# Patient Record
Sex: FEMALE | Race: WHITE | URBAN - METROPOLITAN AREA
[De-identification: names, ages, dates, MRNs, and addresses within clinical notes are randomized per-mention and may not be internally consistent; named-entity substitution may affect disease eponyms.]

---

## 2023-02-17 ENCOUNTER — APPOINTMENT (RX ONLY)
Dept: URBAN - METROPOLITAN AREA CLINIC 330 | Facility: CLINIC | Age: 23
Setting detail: DERMATOLOGY
End: 2023-02-17

## 2023-02-17 DIAGNOSIS — D22 MELANOCYTIC NEVI: ICD-10-CM

## 2023-02-17 PROBLEM — D22.62 MELANOCYTIC NEVI OF LEFT UPPER LIMB, INCLUDING SHOULDER: Status: ACTIVE | Noted: 2023-02-17

## 2023-02-17 PROCEDURE — ? COUNSELING

## 2023-02-17 PROCEDURE — 11301 SHAVE SKIN LESION 0.6-1.0 CM: CPT

## 2023-02-17 PROCEDURE — ? ADDITIONAL NOTES

## 2023-02-17 PROCEDURE — ? SHAVE REMOVAL

## 2023-02-17 ASSESSMENT — LOCATION ZONE DERM: LOCATION ZONE: ARM

## 2023-02-17 ASSESSMENT — LOCATION SIMPLE DESCRIPTION DERM: LOCATION SIMPLE: LEFT SHOULDER

## 2023-02-17 ASSESSMENT — LOCATION DETAILED DESCRIPTION DERM: LOCATION DETAILED: LEFT POSTERIOR SHOULDER

## 2023-02-17 NOTE — PROCEDURE: SHAVE REMOVAL
Medical Necessity Information: It is in your best interest to select a reason for this procedure from the list below. All of these items fulfill various CMS LCD requirements except the new and changing color options.
Medical Necessity Clause: This procedure was medically necessary because the lesion that was treated was:
Lab: 6
Lab Facility: 3
Detail Level: Detailed
Was A Bandage Applied: Yes
Size Of Lesion In Cm (Required): 0.8
X Size Of Lesion In Cm (Optional): 0
Depth Of Shave: dermis
Biopsy Method: Dermablade
Anesthesia Type: 1% lidocaine with epinephrine
Hemostasis: Drysol
Wound Care: Petrolatum
Render Path Notes In Note?: No
Consent was obtained from the patient. The risks and benefits to therapy were discussed in detail. Specifically, the risks of infection, scarring, bleeding, prolonged wound healing, incomplete removal, allergy to anesthesia, nerve injury and recurrence were addressed. Prior to the procedure, the treatment site was clearly identified and confirmed by the patient. All components of Universal Protocol/PAUSE Rule completed.
Post-Care Instructions: I reviewed with the patient in detail post-care instructions. Patient is to keep the biopsy site dry overnight, and then apply bacitracin twice daily until healed. Patient may apply hydrogen peroxide soaks to remove any crusting.
Notification Instructions: Patient will be notified of pathology results. However, patient instructed to call the office if not contacted within 2 weeks.
Billing Type: Third-Party Bill

## 2024-04-27 SDOH — ECONOMIC STABILITY: FOOD INSECURITY: WITHIN THE PAST 12 MONTHS, THE FOOD YOU BOUGHT JUST DIDN'T LAST AND YOU DIDN'T HAVE MONEY TO GET MORE.: NEVER TRUE

## 2024-04-27 SDOH — ECONOMIC STABILITY: HOUSING INSECURITY
IN THE LAST 12 MONTHS, WAS THERE A TIME WHEN YOU DID NOT HAVE A STEADY PLACE TO SLEEP OR SLEPT IN A SHELTER (INCLUDING NOW)?: NO

## 2024-04-27 SDOH — ECONOMIC STABILITY: FOOD INSECURITY: WITHIN THE PAST 12 MONTHS, YOU WORRIED THAT YOUR FOOD WOULD RUN OUT BEFORE YOU GOT MONEY TO BUY MORE.: SOMETIMES TRUE

## 2024-04-27 SDOH — ECONOMIC STABILITY: INCOME INSECURITY: HOW HARD IS IT FOR YOU TO PAY FOR THE VERY BASICS LIKE FOOD, HOUSING, MEDICAL CARE, AND HEATING?: NOT VERY HARD

## 2024-04-27 SDOH — ECONOMIC STABILITY: TRANSPORTATION INSECURITY
IN THE PAST 12 MONTHS, HAS LACK OF TRANSPORTATION KEPT YOU FROM MEETINGS, WORK, OR FROM GETTING THINGS NEEDED FOR DAILY LIVING?: NO

## 2024-04-30 ENCOUNTER — OFFICE VISIT (OUTPATIENT)
Dept: OBGYN CLINIC | Age: 24
End: 2024-04-30
Payer: COMMERCIAL

## 2024-04-30 VITALS
SYSTOLIC BLOOD PRESSURE: 122 MMHG | HEIGHT: 64 IN | DIASTOLIC BLOOD PRESSURE: 84 MMHG | WEIGHT: 169 LBS | BODY MASS INDEX: 28.85 KG/M2

## 2024-04-30 DIAGNOSIS — Z01.419 WELL WOMAN EXAM WITH ROUTINE GYNECOLOGICAL EXAM: Primary | ICD-10-CM

## 2024-04-30 DIAGNOSIS — Z12.4 SCREENING FOR CERVICAL CANCER: ICD-10-CM

## 2024-04-30 DIAGNOSIS — Z11.3 SCREEN FOR STD (SEXUALLY TRANSMITTED DISEASE): ICD-10-CM

## 2024-04-30 DIAGNOSIS — Z11.8 SCREENING EXAMINATION FOR PARASITIC INFECTION: ICD-10-CM

## 2024-04-30 PROCEDURE — 99459 PELVIC EXAMINATION: CPT | Performed by: NURSE PRACTITIONER

## 2024-04-30 PROCEDURE — 99385 PREV VISIT NEW AGE 18-39: CPT | Performed by: NURSE PRACTITIONER

## 2024-04-30 NOTE — PROGRESS NOTES
CC:  Annual GYN exam    HPI:  23 y.o., G0 presents today for a routine gynecological examination and to establish care with our office. Patient's last menstrual period was 04/12/2024.    Pt additionally wants to discuss c/o possible ingrown hair. The patient states she is prone to getting ingrown hairs after shaving vaginal area. States she did notice an ingrown hair to right side of vagina last week and informs me that \"I did mess with it and it opened up.\" Patient states she has been doing routine of the following: exfoliates prior to shaving, shaves, exfoliates again with exfoliating glove and then applies toner to area. States the area to the right side of vagina \"is better than it was because I have been putting healing ointment on but I wanted you to look at it.\" Denies any fevers or chills.     PCP: Patient states was seeing Alisa however, has not seen in 2 years. Unable to view records-will obtain consent for records release today.     Discussed [facility] Internal medicine in same building as us today. States will stop by the desk on the way out to inquire about establishing care as new patient. She is aware of the wait time of a few months.     Contraception: Condoms (sometimes)      Menses:  Q 28-30  Days x 4-6 days, Moderate Flow, No intermenstrual VB/spotting, Mild dysmenorrhea (prior to cycle and days 1 and 2, does not take need to take OTC medications)    No contraindications to estrogen exist        Sexually active w/Male partner  No changes in sexual partners --would like STD testing on Pap today.   Denies post coital VB or dyspareunia.        Ob hx:  G0      GYN HISTORY:  As per HPI     Last Pap: 2022-Neg (unable to view results, will obtain consent for release of records)  Hx of Abnl Paps: Denies    Hx STDs Denies  Gardasil vaccine: Does not believe she has had. Discussed new age recommendations with patient today and encouraged her if she would like to receive to contact insurance company to ensure

## 2024-05-15 LAB
C TRACH RRNA CVX QL NAA+PROBE: NEGATIVE
COLLECTION METHOD: ABNORMAL
CYTOLOGIST CVX/VAG CYTO: ABNORMAL
CYTOLOGY CVX/VAG DOC THIN PREP: ABNORMAL
DATE OF LMP: ABNORMAL
HPV APTIMA: POSITIVE
HPV REFLEX: ABNORMAL
Lab: ABNORMAL
N GONORRHOEA RRNA CVX QL NAA+PROBE: NEGATIVE
OTHER PT INFO: ABNORMAL
PAP SOURCE: ABNORMAL
PATH REPORT.FINAL DX SPEC: ABNORMAL
PATHOLOGIST CVX/VAG CYTO: ABNORMAL
PATHOLOGIST PROVIDED ICD: ABNORMAL
PREV CYTO INFO: NEGATIVE
PREV TREATMENT RESULTS: ABNORMAL
PREV TREATMENT: ABNORMAL
RECOM F/U CVX/VAG CYTO: ABNORMAL
STAT OF ADQ CVX/VAG CYTO-IMP: ABNORMAL
T VAGINALIS RRNA SPEC QL NAA+PROBE: NEGATIVE

## 2024-07-19 LAB
HPV APTIMA: POSITIVE
SPECIMEN SOURCE: ABNORMAL

## 2024-12-17 NOTE — PROGRESS NOTES
CC:  Missed Period    HPI:  24 y.o.  presents for pregnancy confirmation and establish COLLEEN.  Patient's last menstrual period was 10/21/2024.,  sure, regular cycles.      She reports experiencing nausea however, denies vomiting. Able to keep po foods and fluids down.   Encouraged use of B6+Unisom and small, frequent meals to assist.     Denies vb/cramping    She denies abnormal vaginal discharge, itching, odor, irritation, urinary frequency, urgency or dysuria today.   She reports having regular bowel movements and denies constipation.     Genetic testing briefly discussed with patient today however, will discuss further at new ob education visit       OB hx:    Fam hx any chromosomal or inheritable disorders: Denies    Hx abnormal pap:   2024-ASCUS/Neg HPV    Hx Vasovagal syncope:   States 8 years ago, attributed it to headaches  Has not had any flare ups recently    Exposure to Toxoplasmosis:   Has 2 indoor cats. Discussed avoiding litter box during pregnancy.   Will collect Toxoplasma Abs today ___    Anxiety:  Situational anxiety  Did see therapy in college however, was not never put on medications.   Denies depression    Obesity:   BMI: 31  Will collect A1C __        Her Ob history is as follows:  # 1 - Date: None, Sex: None, Weight: None, GA: None, Type: None, Apgar1: None, Apgar5: None, Living: None, Birth Comments: None      Past medical History:   Active Ambulatory Problems     Diagnosis Date Noted    No Active Ambulatory Problems     Resolved Ambulatory Problems     Diagnosis Date Noted    No Resolved Ambulatory Problems     Past Medical History:   Diagnosis Date    Abnormal Pap smear of cervix     Anxiety     BMI 31.0-31.9,adult     Syncope        Current Outpatient Medications   Medication Sig    Prenatal MV-Min-Fe Fum-FA-DHA (PRENATAL 1 PO) Take by mouth     No current facility-administered medications for this visit.       Past Surgical:  has no past surgical history on file.    No Known

## 2024-12-18 ENCOUNTER — PROCEDURE VISIT (OUTPATIENT)
Dept: OBGYN CLINIC | Age: 24
End: 2024-12-18
Payer: COMMERCIAL

## 2024-12-18 ENCOUNTER — OFFICE VISIT (OUTPATIENT)
Dept: OBGYN CLINIC | Age: 24
End: 2024-12-18
Payer: COMMERCIAL

## 2024-12-18 VITALS
DIASTOLIC BLOOD PRESSURE: 82 MMHG | HEIGHT: 64 IN | BODY MASS INDEX: 30.93 KG/M2 | SYSTOLIC BLOOD PRESSURE: 126 MMHG | WEIGHT: 181.2 LBS

## 2024-12-18 DIAGNOSIS — Z87.42 HISTORY OF ABNORMAL CERVICAL PAP SMEAR: ICD-10-CM

## 2024-12-18 DIAGNOSIS — Z3A.08 8 WEEKS GESTATION OF PREGNANCY: ICD-10-CM

## 2024-12-18 DIAGNOSIS — Z34.01 ENCOUNTER FOR PRENATAL CARE IN FIRST TRIMESTER OF FIRST PREGNANCY: ICD-10-CM

## 2024-12-18 DIAGNOSIS — N92.6 MISSED MENSES: Primary | ICD-10-CM

## 2024-12-18 DIAGNOSIS — F41.9 ANXIETY: ICD-10-CM

## 2024-12-18 DIAGNOSIS — R55 VASOVAGAL SYNCOPE: ICD-10-CM

## 2024-12-18 PROBLEM — G43.709 CHRONIC MIGRAINE WITHOUT AURA WITHOUT STATUS MIGRAINOSUS, NOT INTRACTABLE: Status: RESOLVED | Noted: 2017-06-05 | Resolved: 2024-12-18

## 2024-12-18 LAB
ABO + RH BLD: NORMAL
BASOPHILS # BLD: 0 K/UL (ref 0–0.2)
BASOPHILS NFR BLD: 0 % (ref 0–2)
BLOOD GROUP ANTIBODIES SERPL: NORMAL
DIFFERENTIAL METHOD BLD: ABNORMAL
EOSINOPHIL # BLD: 0.2 K/UL (ref 0–0.8)
EOSINOPHIL NFR BLD: 1 % (ref 0.5–7.8)
ERYTHROCYTE [DISTWIDTH] IN BLOOD BY AUTOMATED COUNT: 13.2 % (ref 11.9–14.6)
EST. AVERAGE GLUCOSE BLD GHB EST-MCNC: 98 MG/DL
HBA1C MFR BLD: 5.1 % (ref 0–5.6)
HBV SURFACE AG SER QL: NONREACTIVE
HCT VFR BLD AUTO: 41 % (ref 35.8–46.3)
HCV AB SER QL: NONREACTIVE
HGB BLD-MCNC: 13.9 G/DL (ref 11.7–15.4)
HIV 1+2 AB+HIV1 P24 AG SERPL QL IA: NONREACTIVE
HIV 1/2 RESULT COMMENT: NORMAL
IMM GRANULOCYTES # BLD AUTO: 0.1 K/UL (ref 0–0.5)
IMM GRANULOCYTES NFR BLD AUTO: 0 % (ref 0–5)
LYMPHOCYTES # BLD: 1.8 K/UL (ref 0.5–4.6)
LYMPHOCYTES NFR BLD: 14 % (ref 13–44)
MCH RBC QN AUTO: 29 PG (ref 26.1–32.9)
MCHC RBC AUTO-ENTMCNC: 33.9 G/DL (ref 31.4–35)
MCV RBC AUTO: 85.4 FL (ref 82–102)
MONOCYTES # BLD: 0.5 K/UL (ref 0.1–1.3)
MONOCYTES NFR BLD: 4 % (ref 4–12)
NEUTS SEG # BLD: 10.1 K/UL (ref 1.7–8.2)
NEUTS SEG NFR BLD: 81 % (ref 43–78)
NRBC # BLD: 0 K/UL (ref 0–0.2)
PLATELET # BLD AUTO: 228 K/UL (ref 150–450)
PMV BLD AUTO: 10.4 FL (ref 9.4–12.3)
RBC # BLD AUTO: 4.8 M/UL (ref 4.05–5.2)
RUBV IGG SERPL IA-ACNC: 69.2 IU/ML
T PALLIDUM AB SER QL IA: NONREACTIVE
WBC # BLD AUTO: 12.7 K/UL (ref 4.3–11.1)

## 2024-12-18 PROCEDURE — 99214 OFFICE O/P EST MOD 30 MIN: CPT | Performed by: NURSE PRACTITIONER

## 2024-12-18 PROCEDURE — 76830 TRANSVAGINAL US NON-OB: CPT | Performed by: OBSTETRICS & GYNECOLOGY

## 2024-12-18 NOTE — PROGRESS NOTES
I have reviewed the patients chart and note and agree with plan set forth by Kat Ayala NP.    Trish Boo, DO

## 2024-12-18 NOTE — PATIENT INSTRUCTIONS
PTL/labor precautions, FMC, and pregnancy warning signs reviewed. Pt advised to call the office at [phone] or go straight to Labor and Delivery at [facility] with any of the following concerns vaginal bleeding, leaking of fluid, marline regularly Q 5-7 minutes for over an hour.

## 2024-12-19 LAB
T GONDII IGG SERPL IA-ACNC: <3 IU/ML (ref 0–7.1)
T GONDII IGM SER IA-ACNC: <3 AU/ML (ref 0–7.9)
TOXOPLASMA COMMENT: NORMAL

## 2024-12-20 LAB
BACTERIA SPEC CULT: ABNORMAL
BACTERIA SPEC CULT: ABNORMAL
SERVICE CMNT-IMP: ABNORMAL

## 2024-12-23 ENCOUNTER — TELEPHONE (OUTPATIENT)
Dept: OBGYN CLINIC | Age: 24
End: 2024-12-23

## 2024-12-23 PROBLEM — O99.891 ASYMPTOMATIC BACTERIURIA DURING PREGNANCY: Status: ACTIVE | Noted: 2024-12-23

## 2024-12-23 PROBLEM — R82.71 ASYMPTOMATIC BACTERIURIA DURING PREGNANCY: Status: ACTIVE | Noted: 2024-12-23

## 2024-12-23 RX ORDER — NITROFURANTOIN 25; 75 MG/1; MG/1
100 CAPSULE ORAL 2 TIMES DAILY
Qty: 20 CAPSULE | Refills: 0 | Status: SHIPPED | OUTPATIENT
Start: 2024-12-23 | End: 2025-01-02

## 2024-12-26 NOTE — PROGRESS NOTES
NOB consult with patient and the father of baby. Labs today: NIPT, Carrier testing. Offered pt the option of CF, SMA, and Fragile X carrier testing. Pt desires testing. Offered pt the option of genetic screening (1st screen vs Tetra vs NIPT). Pt desires NIPT. Instructed pt on exercise/nutrition in pregnancy. Reviewed [facility] preg book. Advised pt on using [facility] for hospital needs and [facility] L&D for pregnancy related emergencies. IMPACTT Program discussed with patient and sheet given to her during visit today. Encouraged her to inform us should she start having increase in depression/anxiety. Pt states understanding. NOB forms signed, scanned, and given to pt.     Vitals:  Weight: 182 lb  BMI: 31    Medical Hx: Abnormal pap smear (04/2024) ASCUS neg HPV. - Vasovagal syncope (2016) no recent flare ups. - Chronic migraines.  Anxiety, stable.    Surgical Hx: None.    Last Pap: 04/2024 ASCUS neg HPV.    Pt OB c/o: Patient states that she is considering transferring her care to a birthing center.    Fam hx any chromosomal or inheritable disorders: None.    COVID Vaccine: x2 per patient  Flu Vaccine: Discussed flu recommendations with patient. Patient declines flu vaccine.    OB hx: G1: 01/2025, Current.    NV: Next appointment scheduled on 1/21 with Dr Khan.

## 2025-01-02 ENCOUNTER — NURSE ONLY (OUTPATIENT)
Dept: OBGYN CLINIC | Age: 25
End: 2025-01-02

## 2025-01-02 VITALS — HEIGHT: 64 IN | BODY MASS INDEX: 31.07 KG/M2 | WEIGHT: 182 LBS

## 2025-01-02 DIAGNOSIS — F41.9 ANXIETY: ICD-10-CM

## 2025-01-02 DIAGNOSIS — Z34.91 NORMAL PREGNANCY, FIRST TRIMESTER: ICD-10-CM

## 2025-01-02 DIAGNOSIS — Z3A.10 10 WEEKS GESTATION OF PREGNANCY: Primary | ICD-10-CM

## 2025-01-02 DIAGNOSIS — Z87.42 HISTORY OF ABNORMAL CERVICAL PAP SMEAR: ICD-10-CM

## 2025-01-02 DIAGNOSIS — R55 VASOVAGAL SYNCOPE: ICD-10-CM

## 2025-01-10 LAB
Lab: NEGATIVE
Lab: NORMAL
NTRA ALPHA-THALASSEMIA: NEGATIVE
NTRA BETA-HEMOGLOBINOPATHIES: NEGATIVE
NTRA CANAVAN DISEASE: NEGATIVE
NTRA CYSTIC FIBROSIS: NEGATIVE
NTRA DUCHENNE/BECKER MUSCULAR DYSTROPHY: NEGATIVE
NTRA FAMILIAL DYSAUTONOMIA: NEGATIVE
NTRA FRAGILE X SYNDROME: NEGATIVE
NTRA GALACTOSEMIA: NEGATIVE
NTRA GAUCHER DISEASE: NEGATIVE
NTRA MEDIUM CHAIN ACYL-COA DEHYDROGENASE DEFICIENCY: NEGATIVE
NTRA POLYCYSTIC KIDNEY DISEASE, AUTOSOMAL RECESSIVE: NEGATIVE
NTRA SMITH-LEMLI-OPITZ SYNDROME: NEGATIVE
NTRA SPINAL MUSCULAR ATROPHY: NEGATIVE
NTRA TAY-SACHS DISEASE: NEGATIVE

## 2025-01-21 ENCOUNTER — ROUTINE PRENATAL (OUTPATIENT)
Dept: OBGYN CLINIC | Age: 25
End: 2025-01-21

## 2025-01-21 VITALS — SYSTOLIC BLOOD PRESSURE: 108 MMHG | BODY MASS INDEX: 31.24 KG/M2 | WEIGHT: 182 LBS | DIASTOLIC BLOOD PRESSURE: 68 MMHG

## 2025-01-21 DIAGNOSIS — Z34.01 SUPERVISION OF NORMAL FIRST PREGNANCY IN FIRST TRIMESTER: Primary | ICD-10-CM

## 2025-01-21 DIAGNOSIS — Z11.3 SCREENING FOR STD (SEXUALLY TRANSMITTED DISEASE): ICD-10-CM

## 2025-01-21 DIAGNOSIS — Z12.4 SCREENING FOR CERVICAL CANCER: ICD-10-CM

## 2025-01-21 DIAGNOSIS — R87.610 ATYPICAL SQUAMOUS CELLS OF UNDETERMINED SIGNIFICANCE ON CYTOLOGIC SMEAR OF CERVIX (ASC-US): ICD-10-CM

## 2025-01-21 DIAGNOSIS — Z11.8 SCREENING EXAMINATION FOR PARASITIC INFECTION: ICD-10-CM

## 2025-01-21 PROCEDURE — 0500F INITIAL PRENATAL CARE VISIT: CPT | Performed by: OBSTETRICS & GYNECOLOGY

## 2025-01-21 NOTE — PROGRESS NOTES
Musculoskeletal: Normal range of motion and neck supple.      Thyroid: No thyromegaly.      Vascular: No JVD.      Trachea: No tracheal deviation.   Cardiovascular:      Rate and Rhythm: Normal rate and regular rhythm.      Heart sounds: Normal heart sounds. No murmur. No friction rub. No gallop.    Pulmonary:      Effort: Pulmonary effort is normal. No respiratory distress.      Breath sounds: Normal breath sounds. No stridor. No wheezing, rhonchi or rales.   Chest:      Chest wall: No tenderness.      Breasts:         Right: No inverted nipple, mass, nipple discharge, skin change or tenderness.         Left: No inverted nipple, mass, nipple discharge, skin change or tenderness.   Abdominal:      General: Bowel sounds are normal. There is no distension.      Palpations: Abdomen is soft. There is no mass.      Tenderness: There is no abdominal tenderness. There is no guarding or rebound.   Genitourinary:     Labia:         Right: No rash, tenderness, lesion or injury.         Left: No rash, tenderness, lesion or injury.       Vagina: Normal. No signs of injury and foreign body. No vaginal discharge, erythema, tenderness or bleeding.      Cervix: No cervical motion tenderness, discharge or friability.      Uterus: Enlarged and not tender.       Adnexa:         Right: No mass, tenderness or fullness.          Left: No mass, tenderness or fullness.        Comments: External genitalia are normal in appearance.    Examination of urethral meatus reveals location normal and size normal.   Examination of urethra shows no abnormalities.   Examination of vaginal vault reveals no abnormalities.   Cervix is long and closed without visible pathology.   Pap smear collected.  Uterine portion of bimanual exam reveals contour normal, shape regular, descensus absent, no nodularity, no tenderness and size 13 weeks GA.    Adnexa and parametria exam reveals no masses, nontender.    Visual examination of anus and perineum shows no

## 2025-01-29 LAB
C TRACH RRNA CVX QL NAA+PROBE: NEGATIVE
COLLECTION METHOD: ABNORMAL
CYTOLOGIST CVX/VAG CYTO: ABNORMAL
CYTOLOGY CVX/VAG DOC THIN PREP: ABNORMAL
DATE OF LMP: 0
HPV APTIMA: POSITIVE
HPV GENOTYPE 18,45: NEGATIVE
HPV GENOTYPE REFLEX: ABNORMAL
HPV, GENOTYPE 16: NEGATIVE
Lab: ABNORMAL
N GONORRHOEA RRNA CVX QL NAA+PROBE: NEGATIVE
OTHER PT INFO: ABNORMAL
PAP SOURCE: ABNORMAL
PATH REPORT.FINAL DX SPEC: ABNORMAL
PREV CYTO INFO: ABNORMAL
PREV TREATMENT RESULTS: ABNORMAL
PREV TREATMENT: ABNORMAL
STAT OF ADQ CVX/VAG CYTO-IMP: ABNORMAL
T VAGINALIS RRNA SPEC QL NAA+PROBE: NEGATIVE

## 2025-02-18 ENCOUNTER — ROUTINE PRENATAL (OUTPATIENT)
Dept: OBGYN CLINIC | Age: 25
End: 2025-02-18

## 2025-02-18 VITALS — WEIGHT: 186 LBS | BODY MASS INDEX: 31.93 KG/M2 | DIASTOLIC BLOOD PRESSURE: 74 MMHG | SYSTOLIC BLOOD PRESSURE: 126 MMHG

## 2025-02-18 DIAGNOSIS — O23.41 URINARY TRACT INFECTION IN MOTHER DURING FIRST TRIMESTER OF PREGNANCY: Primary | ICD-10-CM

## 2025-02-18 PROCEDURE — 0502F SUBSEQUENT PRENATAL CARE: CPT | Performed by: OBSTETRICS & GYNECOLOGY

## 2025-02-18 SDOH — ECONOMIC STABILITY: FOOD INSECURITY: WITHIN THE PAST 12 MONTHS, YOU WORRIED THAT YOUR FOOD WOULD RUN OUT BEFORE YOU GOT MONEY TO BUY MORE.: SOMETIMES TRUE

## 2025-02-18 SDOH — ECONOMIC STABILITY: FOOD INSECURITY: WITHIN THE PAST 12 MONTHS, THE FOOD YOU BOUGHT JUST DIDN'T LAST AND YOU DIDN'T HAVE MONEY TO GET MORE.: NEVER TRUE

## 2025-02-18 NOTE — PROGRESS NOTES
Chief Complaint   Patient presents with    Routine Prenatal Visit       No VB, no significant cramping.     No Known Allergies  Current Outpatient Medications   Medication Sig Dispense Refill    Prenatal MV-Min-Fe Fum-FA-DHA (PRENATAL 1 PO) Take by mouth       No current facility-administered medications for this visit.     Past Medical History:   Diagnosis Date    Abnormal Pap smear of cervix     4/30/2024-ASCUS/Neg HPV    Anxiety     Situational anxiety Did see therapy in college however, was not never put on medications.  Denies depression    BMI 31.0-31.9,adult     Chronic migraine without aura without status migrainosus, not intractable 06/05/2017    Syncope     Vasovagal Syncope-States 8 years ago, attributed it to headaches Has not had any flare ups recently     No past surgical history on file.  Social History     Socioeconomic History    Marital status: Single     Spouse name: Not on file    Number of children: Not on file    Years of education: Not on file    Highest education level: Not on file   Occupational History    Not on file   Tobacco Use    Smoking status: Never    Smokeless tobacco: Never   Vaping Use    Vaping status: Never Used   Substance and Sexual Activity    Alcohol use: Not Currently     Alcohol/week: 3.0 standard drinks of alcohol     Types: 1 Glasses of wine, 2 Shots of liquor per week     Comment: only if im at a social event maybe once or twice a month    Drug use: Never    Sexual activity: Yes     Partners: Male   Other Topics Concern    Not on file   Social History Narrative    Not on file     Social Drivers of Health     Financial Resource Strain: Not on file   Food Insecurity: Food Insecurity Present (2/18/2025)    Hunger Vital Sign     Worried About Running Out of Food in the Last Year: Sometimes true     Ran Out of Food in the Last Year: Never true   Transportation Needs: No Transportation Needs (2/18/2025)    PRAPARE - Transportation     Lack of Transportation (Medical): No

## 2025-02-20 LAB
BACTERIA SPEC CULT: NORMAL
SERVICE CMNT-IMP: NORMAL

## 2025-03-24 ENCOUNTER — ROUTINE PRENATAL (OUTPATIENT)
Dept: OBGYN CLINIC | Age: 25
End: 2025-03-24

## 2025-03-24 ENCOUNTER — PROCEDURE VISIT (OUTPATIENT)
Dept: OBGYN CLINIC | Age: 25
End: 2025-03-24
Payer: COMMERCIAL

## 2025-03-24 VITALS — BODY MASS INDEX: 32.82 KG/M2 | SYSTOLIC BLOOD PRESSURE: 134 MMHG | DIASTOLIC BLOOD PRESSURE: 82 MMHG | WEIGHT: 191.2 LBS

## 2025-03-24 DIAGNOSIS — Z36.89 ENCOUNTER FOR FETAL ANATOMIC SURVEY: ICD-10-CM

## 2025-03-24 DIAGNOSIS — Z86.69 HISTORY OF MIGRAINE: ICD-10-CM

## 2025-03-24 DIAGNOSIS — F41.9 ANXIETY: ICD-10-CM

## 2025-03-24 DIAGNOSIS — Z34.02 PRIMIGRAVIDA IN SECOND TRIMESTER: Primary | ICD-10-CM

## 2025-03-24 DIAGNOSIS — Z34.01 SUPERVISION OF NORMAL FIRST PREGNANCY IN FIRST TRIMESTER: Primary | ICD-10-CM

## 2025-03-24 DIAGNOSIS — O99.212 OBESITY AFFECTING PREGNANCY IN SECOND TRIMESTER, UNSPECIFIED OBESITY TYPE: ICD-10-CM

## 2025-03-24 DIAGNOSIS — Z87.42 HISTORY OF ABNORMAL CERVICAL PAP SMEAR: ICD-10-CM

## 2025-03-24 PROCEDURE — 76805 OB US >/= 14 WKS SNGL FETUS: CPT | Performed by: OBSTETRICS & GYNECOLOGY

## 2025-03-24 PROCEDURE — 0502F SUBSEQUENT PRENATAL CARE: CPT | Performed by: OBSTETRICS & GYNECOLOGY

## 2025-03-24 NOTE — PROGRESS NOTES
OB return  Tiffanie Lanza is a 24 y.o. female   with 22w0d IUP with Estimated Date of Delivery: 25 presenting to the clinic as a routine OB.   Denies any complaints.  Patient of CB    OB US report:  Images reviewed today. Report scanned into media  Indication: anatomy US  Findings: vertex, 553g, 58%, AC 79%, CL 3.44cm  Assessment: anatomy normal and complete    Problem list reviewed and updated    Pregnancy complicated by:  1. Obesity- pregravid bmi 31, A1c 5.1    2. Anxiety- stable, no meds    3. Hx of migraines    4. Hx of abnormal pap smears- hx of ASCUS, HPV negative, rpt at new OB normal but positive other HPV  Plan: repeat cotesting in 12 months    Physical Examination:  /82   Wt 86.7 kg (191 lb 3.2 oz)   LMP 10/21/2024   BMI 32.82 kg/m²    Gen: AAOx3  Ab: soft, NTTP, gravid uterus  Skin: no edema    Plan:  --reviewed US findings  --RTC 4 weeks with glucola

## 2025-04-21 ENCOUNTER — ROUTINE PRENATAL (OUTPATIENT)
Dept: OBGYN CLINIC | Age: 25
End: 2025-04-21

## 2025-04-21 VITALS — WEIGHT: 202 LBS | BODY MASS INDEX: 34.67 KG/M2 | DIASTOLIC BLOOD PRESSURE: 60 MMHG | SYSTOLIC BLOOD PRESSURE: 122 MMHG

## 2025-04-21 DIAGNOSIS — Z34.02 PRIMIGRAVIDA IN SECOND TRIMESTER: Primary | ICD-10-CM

## 2025-04-21 DIAGNOSIS — Z13.0 SCREENING FOR IRON DEFICIENCY ANEMIA: ICD-10-CM

## 2025-04-21 DIAGNOSIS — Z13.1 SCREENING FOR DIABETES MELLITUS (DM): ICD-10-CM

## 2025-04-21 LAB
ERYTHROCYTE [DISTWIDTH] IN BLOOD BY AUTOMATED COUNT: 13.9 % (ref 11.9–14.6)
GLUCOSE 1 HOUR: 154 MG/DL
HCT VFR BLD AUTO: 36.1 % (ref 35.8–46.3)
HGB BLD-MCNC: 11.8 G/DL (ref 11.7–15.4)
MCH RBC QN AUTO: 29.2 PG (ref 26.1–32.9)
MCHC RBC AUTO-ENTMCNC: 32.7 G/DL (ref 31.4–35)
MCV RBC AUTO: 89.4 FL (ref 82–102)
NRBC # BLD: 0 K/UL (ref 0–0.2)
PLATELET # BLD AUTO: 198 K/UL (ref 150–450)
PMV BLD AUTO: 10.6 FL (ref 9.4–12.3)
RBC # BLD AUTO: 4.04 M/UL (ref 4.05–5.2)
WBC # BLD AUTO: 12.6 K/UL (ref 4.3–11.1)

## 2025-04-21 PROCEDURE — 0502F SUBSEQUENT PRENATAL CARE: CPT | Performed by: OBSTETRICS & GYNECOLOGY

## 2025-04-21 NOTE — PROGRESS NOTES
Chief Complaint   Patient presents with    Routine Prenatal Visit     Right side rib pain, feels like burning     +FM.  No VB, no significant cramping.     No Known Allergies  Current Outpatient Medications   Medication Sig Dispense Refill    Prenatal MV-Min-Fe Fum-FA-DHA (PRENATAL 1 PO) Take by mouth       No current facility-administered medications for this visit.     Past Medical History:   Diagnosis Date    Abnormal Pap smear of cervix     4/30/2024-ASCUS/Neg HPV    Anxiety     Situational anxiety Did see therapy in college however, was not never put on medications.  Denies depression    BMI 31.0-31.9,adult     Chronic migraine without aura without status migrainosus, not intractable 06/05/2017    Syncope     Vasovagal Syncope-States 8 years ago, attributed it to headaches Has not had any flare ups recently     No past surgical history on file.  Social History     Socioeconomic History    Marital status: Single     Spouse name: Not on file    Number of children: Not on file    Years of education: Not on file    Highest education level: Not on file   Occupational History    Not on file   Tobacco Use    Smoking status: Never    Smokeless tobacco: Never   Vaping Use    Vaping status: Never Used   Substance and Sexual Activity    Alcohol use: Not Currently     Alcohol/week: 3.0 standard drinks of alcohol     Types: 1 Glasses of wine, 2 Shots of liquor per week     Comment: only if im at a social event maybe once or twice a month    Drug use: Never    Sexual activity: Yes     Partners: Male   Other Topics Concern    Not on file   Social History Narrative    Not on file     Social Drivers of Health     Financial Resource Strain: Not on file   Food Insecurity: Food Insecurity Present (2/18/2025)    Hunger Vital Sign     Worried About Running Out of Food in the Last Year: Sometimes true     Ran Out of Food in the Last Year: Never true   Transportation Needs: No Transportation Needs (2/18/2025)    PRAPARE -

## 2025-04-29 ENCOUNTER — RESULTS FOLLOW-UP (OUTPATIENT)
Dept: OBGYN CLINIC | Age: 25
End: 2025-04-29

## 2025-04-29 DIAGNOSIS — R73.9 BLOOD GLUCOSE ELEVATED: Primary | ICD-10-CM

## 2025-04-29 DIAGNOSIS — Z3A.27 27 WEEKS GESTATION OF PREGNANCY: ICD-10-CM

## 2025-05-05 ENCOUNTER — LAB (OUTPATIENT)
Dept: OBGYN CLINIC | Age: 25
End: 2025-05-05

## 2025-05-05 ENCOUNTER — ROUTINE PRENATAL (OUTPATIENT)
Dept: OBGYN CLINIC | Age: 25
End: 2025-05-05

## 2025-05-05 VITALS — BODY MASS INDEX: 33.99 KG/M2 | DIASTOLIC BLOOD PRESSURE: 60 MMHG | WEIGHT: 198 LBS | SYSTOLIC BLOOD PRESSURE: 110 MMHG

## 2025-05-05 DIAGNOSIS — R73.9 BLOOD GLUCOSE ELEVATED: ICD-10-CM

## 2025-05-05 DIAGNOSIS — Z3A.27 27 WEEKS GESTATION OF PREGNANCY: ICD-10-CM

## 2025-05-05 DIAGNOSIS — Z34.02 PRIMIGRAVIDA IN SECOND TRIMESTER: Primary | ICD-10-CM

## 2025-05-05 DIAGNOSIS — O99.810 ABNORMAL GLUCOSE TOLERANCE TEST DURING PREGNANCY, ANTEPARTUM: ICD-10-CM

## 2025-05-05 LAB
GESTATIONAL 3HR GTT: ABNORMAL
GLUCOSE 1H P 100 G GLC PO SERPL-MCNC: 239 MG/DL (ref 0–180)
GLUCOSE 2 HOUR: 154 MG/DL (ref 0–155)
GLUCOSE P FAST SERPL-MCNC: 87 MG/DL (ref 0–95)
GLUCOSE, 3 HOUR: 78 MG/DL (ref 0–140)

## 2025-05-05 PROCEDURE — 0502F SUBSEQUENT PRENATAL CARE: CPT | Performed by: OBSTETRICS & GYNECOLOGY

## 2025-05-06 ENCOUNTER — TELEPHONE (OUTPATIENT)
Dept: DIABETES SERVICES | Age: 25
End: 2025-05-06

## 2025-05-06 DIAGNOSIS — O24.419 GESTATIONAL DIABETES MELLITUS (GDM) IN THIRD TRIMESTER, GESTATIONAL DIABETES METHOD OF CONTROL UNSPECIFIED: Primary | ICD-10-CM

## 2025-05-06 RX ORDER — GLUCOSAMINE HCL/CHONDROITIN SU 500-400 MG
CAPSULE ORAL
Qty: 200 STRIP | Refills: 4 | Status: SHIPPED | OUTPATIENT
Start: 2025-05-06

## 2025-05-06 RX ORDER — AVOBENZONE, HOMOSALATE, OCTISALATE, OCTOCRYLENE 30; 40; 45; 26 MG/ML; MG/ML; MG/ML; MG/ML
1 CREAM TOPICAL DAILY
Qty: 200 EACH | Refills: 4 | Status: SHIPPED | OUTPATIENT
Start: 2025-05-06

## 2025-05-06 RX ORDER — BLOOD-GLUCOSE METER
1 KIT MISCELLANEOUS DAILY
Qty: 1 KIT | Refills: 0 | Status: SHIPPED | OUTPATIENT
Start: 2025-05-06 | End: 2025-05-09

## 2025-05-06 NOTE — TELEPHONE ENCOUNTER
GDM. Called about free Diabetes education and scheduled GDM class for 5- at 2 PM.  [phone] or [phone].

## 2025-05-09 RX ORDER — BLOOD-GLUCOSE METER
1 KIT MISCELLANEOUS DAILY
Qty: 1 KIT | Refills: 0 | Status: SHIPPED | OUTPATIENT
Start: 2025-05-09

## 2025-05-14 PROBLEM — O24.410 DIET CONTROLLED GESTATIONAL DIABETES MELLITUS (GDM) IN THIRD TRIMESTER: Status: ACTIVE | Noted: 2025-05-14

## 2025-05-19 ENCOUNTER — TELEMEDICINE (OUTPATIENT)
Dept: OBGYN CLINIC | Age: 25
End: 2025-05-19
Payer: COMMERCIAL

## 2025-05-19 DIAGNOSIS — O24.410 DIET CONTROLLED GESTATIONAL DIABETES MELLITUS (GDM) IN THIRD TRIMESTER: Primary | ICD-10-CM

## 2025-05-19 PROCEDURE — 98960 EDU&TRN PT SELF-MGMT NQHP 1: CPT | Performed by: REGISTERED NURSE

## 2025-05-19 NOTE — PROGRESS NOTES
Monitoring  Current practices  Do you monitor your blood sugar? Yes    How often do you monitor? 4x/day    Readings available today? Yes, verbally    Testing parameters: FBG < 95, PP < 120    What are the range of readings?  mg/dL    Fastin-98 mg/dL  1hrPP:  mg/dL    Do you know your last A1c measurement? Yes    Do you know the meaning of the A1c? Yes     Would benefit from DSMES related to Monitoring: No      Uses BG readings to establish trends and understand BG patterns: Yes      Stage of change: Action - actively involved in changing their behavior and are open to receiving assistance from others    Barriers: None. Not liking eggs.    Healthy Coping   Current state  Problem Areas In Diabetes (PAID) Scale  Total score: 22/100    Scores of 40 and above: severe diabetes distress    Individual items scores 3 or 4: moderate to severe distress     Would benefit from DSMES related to Healthy Coping: Yes      Identifies specific people, organizations,etc, that actively support their diabetes self-care efforts: Yes      Stage of change: Action - actively involved in changing their behavior and are open to receiving assistance from others     Problem Solving  Current state  Pattern Management:  Do you notice blood glucose patterns when you look at the readings in your meter or logbook? Yes    How do you use the blood glucose readings from your meter or logbook? Understand how body responds to meals, Adjust meals based on results     Would benefit from DSMES related to Problem Solving: Yes      Articulates appropriate strategies to address BG pattern: Yes      Stage of change: Action - actively involved in changing their behavior and are open to receiving assistance from others     Source: American Association of Diabetes Educators' Diabetes Education Curriculum: A Guide to Successful Self-Management (3rd edition)      I have personally reviewed the health record, including provider notes, laboratory

## 2025-05-20 ENCOUNTER — ROUTINE PRENATAL (OUTPATIENT)
Dept: OBGYN CLINIC | Age: 25
End: 2025-05-20

## 2025-05-20 VITALS — WEIGHT: 198 LBS | DIASTOLIC BLOOD PRESSURE: 78 MMHG | SYSTOLIC BLOOD PRESSURE: 122 MMHG | BODY MASS INDEX: 33.99 KG/M2

## 2025-05-20 DIAGNOSIS — O24.410 DIET CONTROLLED GESTATIONAL DIABETES MELLITUS (GDM) IN THIRD TRIMESTER: ICD-10-CM

## 2025-05-20 DIAGNOSIS — Z34.02 PRIMIGRAVIDA IN SECOND TRIMESTER: Primary | ICD-10-CM

## 2025-05-20 PROCEDURE — 0502F SUBSEQUENT PRENATAL CARE: CPT | Performed by: OBSTETRICS & GYNECOLOGY

## 2025-05-20 NOTE — PROGRESS NOTES
Chief Complaint   Patient presents with    Routine Prenatal Visit     +FM, no VB, no LOF. No sx of preE.    Vitals:    05/20/25 1133   BP: 122/78         5/20/2025    11:33 AM 5/5/2025     9:36 AM 4/21/2025    10:33 AM 3/24/2025     8:40 AM 2/18/2025     2:38 PM 1/21/2025     4:09 PM 1/2/2025    11:25 AM   Weight Metrics   Weight 198 lb 198 lb 202 lb 191 lb 3.2 oz 186 lb 182 lb 182 lb   BMI (Calculated) 0 kg/m2 0 kg/m2 0 kg/m2 0 kg/m2 0 kg/m2 0 kg/m2 31.3 kg/m2     7.711 kg (17 lb)    FHTs 140s - 150s  FH 30    1. Primigravida in second trimester    2. Diet controlled gestational diabetes mellitus (GDM) in third trimester      No orders of the defined types were placed in this encounter.    No orders of the defined types were placed in this encounter.    23yo G1 at 30w1d for TACHO:    Reviewed PNL. Rh positive. 1hr , see next.  Abnormal GTT: Pt undergoing 3hr GTT today. Questions answered.   Routine OB counseling reviewed.  RTC 2wks for TACHO.    No follow-ups on file.

## 2025-05-28 PROBLEM — O99.343 ANXIETY DURING PREGNANCY IN THIRD TRIMESTER, ANTEPARTUM: Status: ACTIVE | Noted: 2025-03-24

## 2025-05-28 PROBLEM — Z87.42 HISTORY OF ABNORMAL CERVICAL PAP SMEAR: Status: RESOLVED | Noted: 2025-03-24 | Resolved: 2025-05-28

## 2025-05-28 PROBLEM — O09.93 HIGH-RISK PREGNANCY IN THIRD TRIMESTER: Status: ACTIVE | Noted: 2025-03-24

## 2025-06-04 ENCOUNTER — TELEPHONE (OUTPATIENT)
Dept: OBGYN CLINIC | Age: 25
End: 2025-06-04

## 2025-06-04 ENCOUNTER — ROUTINE PRENATAL (OUTPATIENT)
Dept: OBGYN CLINIC | Age: 25
End: 2025-06-04
Payer: COMMERCIAL

## 2025-06-04 VITALS — DIASTOLIC BLOOD PRESSURE: 79 MMHG | SYSTOLIC BLOOD PRESSURE: 122 MMHG

## 2025-06-04 DIAGNOSIS — Z86.69 HISTORY OF MIGRAINE: ICD-10-CM

## 2025-06-04 DIAGNOSIS — O99.891 ASYMPTOMATIC BACTERIURIA DURING PREGNANCY: ICD-10-CM

## 2025-06-04 DIAGNOSIS — O24.410 DIET CONTROLLED GESTATIONAL DIABETES MELLITUS (GDM) IN THIRD TRIMESTER: ICD-10-CM

## 2025-06-04 DIAGNOSIS — O09.93 HIGH-RISK PREGNANCY IN THIRD TRIMESTER: ICD-10-CM

## 2025-06-04 DIAGNOSIS — F41.9 ANXIETY DURING PREGNANCY IN THIRD TRIMESTER, ANTEPARTUM: Primary | ICD-10-CM

## 2025-06-04 DIAGNOSIS — O99.212 OBESITY AFFECTING PREGNANCY IN SECOND TRIMESTER, UNSPECIFIED OBESITY TYPE: ICD-10-CM

## 2025-06-04 DIAGNOSIS — O99.343 ANXIETY DURING PREGNANCY IN THIRD TRIMESTER, ANTEPARTUM: Primary | ICD-10-CM

## 2025-06-04 DIAGNOSIS — R82.71 ASYMPTOMATIC BACTERIURIA DURING PREGNANCY: ICD-10-CM

## 2025-06-04 PROCEDURE — 76811 OB US DETAILED SNGL FETUS: CPT | Performed by: OBSTETRICS & GYNECOLOGY

## 2025-06-04 PROCEDURE — 76825 ECHO EXAM OF FETAL HEART: CPT | Performed by: OBSTETRICS & GYNECOLOGY

## 2025-06-04 PROCEDURE — 93325 DOPPLER ECHO COLOR FLOW MAPG: CPT | Performed by: OBSTETRICS & GYNECOLOGY

## 2025-06-04 PROCEDURE — 76819 FETAL BIOPHYS PROFIL W/O NST: CPT | Performed by: OBSTETRICS & GYNECOLOGY

## 2025-06-04 PROCEDURE — 99204 OFFICE O/P NEW MOD 45 MIN: CPT | Performed by: OBSTETRICS & GYNECOLOGY

## 2025-06-04 PROCEDURE — 76827 ECHO EXAM OF FETAL HEART: CPT | Performed by: OBSTETRICS & GYNECOLOGY

## 2025-06-04 ASSESSMENT — PATIENT HEALTH QUESTIONNAIRE - PHQ9
2. FEELING DOWN, DEPRESSED OR HOPELESS: SEVERAL DAYS
SUM OF ALL RESPONSES TO PHQ QUESTIONS 1-9: 2
1. LITTLE INTEREST OR PLEASURE IN DOING THINGS: SEVERAL DAYS
SUM OF ALL RESPONSES TO PHQ QUESTIONS 1-9: 2

## 2025-06-04 NOTE — PROGRESS NOTES
[facility]    [address] [place] [address]  P- [phone]  [phone]   MFM Consultation  Presents for evaluation of the following chief complaint(s):   Chief Complaint   Patient presents with    New Patient     GDM       Tiffanie Lanza (2000) is a 24 y.o.  at 32w2d with 2025, by Last Menstrual Period.     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   Personal and family history reviewed and updated as indicated.   Records from pregnancy reviewed. Chart updated to portray current issues.   Pt is scheduled to see Primary OB (CW) on 25.     No HAs, edema.  Denies preeclamptic symptoms.  Reports good fetal movement.  No bleeding, LOF, cramping, ctxs, or vaginal pressure.        Mood evaluated today based on discussion with pt and PHQ screen. Mood concerns addressed as needed.    Mood Reassuring today  Recommend lifestyle/behavioral modifications to enhance mood (exercise, sunshine, mood apps, nutritional modifications, etc).   Reports some mild anxiety after glucose test but denies need for counseling. Mood stable; given mom's IMPACTT information. Anxious leading up to each finger stick. Tearful today re all the \"what ifs\".     Shared decision making addressing anxiety in addition to fetal condition and current glucose levels. We will hold testing at this time, will plan to restart 4 days prior to next MFM appt. Continuation vs discontinuation to be determined at that time.     GM dx type 2 ~2 weeks ago, lifestyle component. Lifestyle recommendations reviewed.   Elevations associated with stressors.     Reviewed gestational age precautions and activity goals/limitations; addressed normal pregnancy complaints, reassured and offered suggestions for care.  Nutritional counseling as well as specific goals based on current maternal and fetal status; options for GERD,

## 2025-06-04 NOTE — TELEPHONE ENCOUNTER
----- Message from TRENT AUSTIN RN sent at 6/4/2025  2:45 PM EDT -----  Begin weekly testing at 36 weeks.   unsure when last was completed. Patient reports sensation clears with liquid wash. Patient reports increased sensation/clearnace with head rotation right. Patient with inconsistent delayed throat clearing noted across PO trials, which was not present at baseline. Current diet recommendation dysphagia soft/bite sized with thin liquids. If patient presents with increased s/s, may benefit from f/u MBSS. Treatment Plan  Requires SLP Intervention: Yes  Duration/Frequency of Treatment: 2-4x/wk for LOS  D/C Recommendations: To be determined       Recommended Diet and Intervention  Diet Solids Recommendation: Dysphagia Soft and Bite-Sized (Dysphagia III)  Liquid Consistency Recommendation: Thin  Recommended Form of Meds: Meds in puree  Recommendations: Modified barium swallow study; Dysphagia treatment(Potential MBSS pending tx)  Therapeutic Interventions: Pharyngeal exercises; Laryngeal exercises;Oral care;Diet tolerance monitoring;Patient/Family education; Tongue base strengthening; Therapeutic PO trials with SLP    Compensatory Swallowing Strategies  Compensatory Swallowing Strategies: Alternate solids and liquids;Small bites/sips; Remain upright for 30-45 minutes after meals;Upright as possible for all oral intake;Eat/Feed slowly(Head turn right if globus sensation occurs)    Treatment/Goals  Short-term Goals  Timeframe for Short-term Goals: 1 week (6/22)  Long-term Goals  Timeframe for Long-term Goals: 1 week (6/22)  Goal 1: Pt will tolerate least restricive diet w/o s/s of penetraion/aspiration while maintaining respiratory status. Dysphagia Goals: The patient will tolerate recommended diet without observed clinical signs of aspiration; The patient will recall and perform compensatory strategies, with no cues. General  Chart Reviewed: Yes  Comments: Patient with hx of esophageal dilations. Inpatient rehab in 2018 to address cog and dysphagia. Subjective  Subjective: Patient positioned upright in bed.  Agreeable to PO intake this date. Behavior/Cognition: Alert; Cooperative;Pleasant mood  Temperature Spikes Noted: N/A  Respiratory Status: O2 via nasual cannula  O2 Device: Nasal cannula  Liters of Oxygen: 3 L  Communication Observation: Functional  Follows Directions: Simple  Dentition: Dentures top;Dentures bottom  Patient Positioning: Upright in bed  Baseline Vocal Quality: Normal  Volitional Cough: Strong  Prior Dysphagia History: Previous MBSS 3/2018 with recommendation of regular and thin liquids. Consistencies Administered: Reg solid; Dysphagia Pureed (Dysphagia I); Dysphagia Minced and Moist (Dysphagia II); Dysphagia Soft and Bite-Sized (Dysphagia III); Thin - straw; Thin - cup           Vision/Hearing       Oral Motor Deficits  Oral/Motor  Oral Motor: Exceptions to [facility]  Lingual ROM: Reduced right  Lingual Symmetry: Abnormal symmetry right(deviated right)    Oral Phase Dysfunction  Oral Phase  Oral Phase: WNL  Oral Phase  Oral Phase - Comment: Adequate mastication of regular consistencies noted, no s/s of premature bolus spilage at this time     Indicators of Pharyngeal Phase Dysfunction   Pharyngeal Phase  Pharyngeal Phase: Exceptions  Indicators of Pharyngeal Phase Dysfunction  Decreased Laryngeal Elevation: All  Throat Clearing - Delayed: All(Inconsistent across trials)  Pharyngeal Phase   Pharyngeal: Patient reports globus sensation of right side near UES with regular solid this date. Able to clear with liquid wash. Patient denied globus sensation with head turn right.  Inconsisitent delayed throat clear noted throughout     Prognosis  Prognosis  Prognosis for safe diet advancement: good  Barriers to reach goals: fatigue  Individuals consulted  Consulted and agree with results and recommendations: Patient;RN    Education  Patient Education: Role of SLP, results of assessment, diet recommendatiosn and POC  Patient Education Response: Verbalizes understanding  Safety Devices in place: Yes  Type of devices: Bed alarm in

## 2025-06-04 NOTE — ASSESSMENT & PLAN NOTE
Pt with preexisting migraine disease in pregnancy.     OTC options-   tylenol, caffeine, hydration, benadryl, mag oxide up to 800mg BID, riboflavin 400mg daily; mónica-relief, excedrin migraine, allergy medications).   BeKool-type head patches, essential oils, dark room, warm compresses, mouthguard if jaw clenching/teeth grinding.    Prescription Recommendations-   reglan/benadryl combo  (MAD protocol)  imitrex/triptans recommended 3rd line.     If continued pain or concerns, recommend referral to neurology for additional recommendations.     Opioid medications, including fiorcet, may be considered if fails all non-opiate medications. But these are not recommended for use due to rebound headaches and  withdrawal concerns.   Ergotamines are not recommended in pregnancy

## 2025-06-04 NOTE — PROGRESS NOTES
[facility] MATERNAL FETAL MEDICINE    [address] [place] [address]  P- [phone]  F-[phone]     MFM Follow-up Visit  Tiffanie Lanza ([id number]) is a 24 y.o.  at 32w2d with 2025, by Last Menstrual Period.     Presents for evaluation of the following chief complaint(s):   No chief complaint on file.        Patient is {Blank Single Select Template:32014::\"working full time\",\"working part time\",\"working PRN\",\"not working outside of home\"}.     Patient is scheduled to see Primary OB ([facility]) on 25.     Support person, ***, present today.     Interval history since prior appt reviewed and chart updated as indicated.    {OBsymptoms:65524}      Mood evaluated today based on discussion with pt and PHQ screen.   {antidepressants:90052::\"Mood Reassuring today\",\"Recommend lifestyle/behavioral modifications to enhance mood (exercise, sunshine, mood apps, nutritional modifications, etc). \"}    Reviewed gestational age precautions and activity goals/limitations; addressed normal pregnancy complaints, reassured and offered suggestions for care  Nutritional counseling as well as specific goals based on current maternal and fetal status; options for GERD, constipation, other common complaints reviewed  Reviewed gestational age appropriate preventive care regarding communicable disease transmission and vaccines as appropriate (including flu, TDaP >28wk, RSV 32-36wk (-), as well as, COVID.)  All questions answered and concerns discussed. Additional recommendations in problem list.     Exam:     There were no vitals filed for this visit.   Applicable labs reviewed.   Appropriate examination performed as documented.    Please see formal ultrasound report under imaging tab.      ASSESSMENT/PLAN:    Patient Active Problem List    Diagnosis Date Noted    Diet controlled gestational diabetes mellitus (GDM) in third trimester 2025     Overview Note:       Ref Range & Units (hover) 25 0830 25

## 2025-06-04 NOTE — ASSESSMENT & PLAN NOTE
Gestational DM develops during pregnancy in women whose pancreatic function is insufficient to overcome the insulin resistance associated with the pregnant state. Among the main consequences are increased risks of preeclampsia, macrosomia, and  delivery, and their associated morbidities. The risks of these outcomes increase as maternal fasting plasma glucose levels increase above 75 mg/dL.     Patient counseled that insulin resistance will rise as pregnancy progresses. She was counseled re appropriate diet choices and activity. She has a moderate understanding and high desire to optimize her pregnancy's health.     In addition, the diagnosis of gestational diabetes in pregnancy raises risk for maternal type 2 diabetes in future (up to 50% within 15 years) and cardiovascular disease. The best way to minimize these risks are to maintain diet changes and increase activity levels. Patients should establish care with a PCP for surveillance every 1-2 years to monitor for development of diabetes.  This risk is modified by breast feeding for at least 6-12 months.     Infants of pregnancies associated with gestational diabetes are at risk for  hypoglycemia due to increased insulin production to manage the glucose which crosses the placenta. In addition, they are at risk for obesity and altered glucose metabolism throughout their life. This risk is decreased with exposure to breast milk.  In those affected by diabetes in pregnancy, consider initiation of  milk expression beginning at 37 weeks.     Antepartum Recommendations:  Monitor glycemic control 2 times daily- fasting and 1 hour after meals rotating through all meals over 3 days., 1 hour postprandial goal <120., and Fasting <95 in the 4 days leading up to next [facility] appt.   Send glucose logs to [facility] and primary OB each week.   Will determine need for  testing depending upon maternal and fetal conditions.   Plan serial growth scans.

## 2025-06-04 NOTE — ASSESSMENT & PLAN NOTE
Preventive Health Recommendations  Female Ages 21 to 25     Yearly exam:     See your health care provider every year in order to  o Review health changes.   o Discuss preventive care.    o Review your medicines if your doctor has prescribed any.      You should be tested each year for STDs (sexually transmitted diseases).       Talk to your provider about how often you should have cholesterol testing.      Get a Pap test every three years. If you have an abnormal result, your doctor may have you test more often.      If you are at risk for diabetes, you should have a diabetes test (fasting glucose).     Shots:     Get a flu shot each year.     Get a tetanus shot every 10 years.     Consider getting the shot (vaccine) that prevents cervical cancer (Gardasil).    Nutrition:     Eat at least 5 servings of fruits and vegetables each day.    Eat whole-grain bread, whole-wheat pasta and brown rice instead of white grains and rice.    Get adequate Calcium and Vitamin D.     Lifestyle    Exercise at least 150 minutes a week each week (30 minutes a day, 5 days a week). This will help you control your weight and prevent disease.    Limit alcohol to one drink per day.    No smoking.     Wear sunscreen to prevent skin cancer.    See your dentist every six months for an exam and cleaning.  Preventive Health Recommendations  Female Ages 21 to 25     Yearly exam:   See your health care provider every year in order to  Review health changes.   Discuss preventive care.    Review your medicines if your doctor has prescribed any.    You should be tested each year for STDs (sexually transmitted diseases).     Talk to your provider about how often you should have cholesterol testing.    Get a Pap test every three years. If you have an abnormal result, your doctor may have you test more often.    If you are at risk for diabetes, you should have a diabetes test (fasting glucose).     Shots:   Get a flu shot each year.   Get a tetanus   Preconception BMI >= 30 increases risk for pregnancy complications, including gestational diabetes, poor or accelerated fetal growth, hypertensive disorders of pregnancy, and abnormal labor progression. In addition, there is an increased risk of fetal demise, as well as congenital anomalies including neural tube defects, cardiac malformations, orofacial defects, and limb reduction abnormalities.     The risk for stillbirth increases with increasing obesity: class I obesity 1.3 [1.2-1.4], class II obesity 1.4 [1.3-1.6], class III obesity 1.9 [1.3-1.6]) and higher stillbirth risk in  obese women (1.9 [1.7-2.1]) than in  obese women (1.4 [1.3-1.5]). Among women with class III obesity (BMI >=40 kg/m2), the risk for stillbirth increased with advancing gestational age: 30 to 33 weeks, hazard and risk ratios 1.40 and 1.69, respectively; 37 to 39 weeks, hazard and risk ratios 3.20 and 2.95, respectively; and 40 to 42 weeks, hazard and risk ratios 3.30 and 8.95, respectively.           Recommend detailed first trimester ultrasound with NT at 12-13 weeks.   Recommend level II ultrasound for anatomy and fetal echo if prepregnancy BMI >30-35 based on AIUM guidelines.     Early evaluation of insulin resistance with HgbA1c at initiation of care- if a1c > 5.5, then follow up with either \"2 step\" 1hr GCT/ 3hr GTT OR \"1 step\" 2hr GTT  Closely monitor blood pressure for development/worsening of hypertensive disorders of pregnancy.    Weight Gain Goal: <15 pounds, it is ok to stay same weight or lose as long as baby growing well  Dietary choices- low carb fine, avoid extreme keto (goal >50-75gm carb/day); not to use intermittent/prolonged fasting without specific discussion with physician.     Continue activity/exercise     The American College of Obstetricians and Gynecologists (ACOG) recommends weekly  surveillance for fetal well-being, starting by 34 weeks and 0 days of gestation for women with a  shot every 10 years.   Consider getting the shot (vaccine) that prevents cervical cancer (Gardasil).    Nutrition:   Eat at least 5 servings of fruits and vegetables each day.  Eat whole-grain bread, whole-wheat pasta and brown rice instead of white grains and rice.  Get adequate Calcium and Vitamin D.     Lifestyle  Exercise at least 150 minutes a week each week (30 minutes a day, 5 days a week). This will help you control your weight and prevent disease.  Limit alcohol to one drink per day.  No smoking.   Wear sunscreen to prevent skin cancer.  See your dentist every six months for an exam and cleaning.

## 2025-06-04 NOTE — ASSESSMENT & PLAN NOTE
MFM 4-5 weeks.     Low dose Aspirin  mg daily is recommended to be started at 12-16 weeks (some benefit seen with starting up to 28 weeks) for the prevention of preeclampsia  in high risk women. Consider stopping Aspirin at 36 weeks.  Recommend Vitamin D 2000IU daily and Calcium 1000mg daily to aid in protection of bones and teeth.  Recommend use of PNV daily with well-balanced diet.  Unless instructed otherwise, recommend continuation of physical activity throughout pregnancy.       Genetic counseling was performed by physician after reviewing patient's genetic history.    The patient's Down syndrome age associated risk, as well as, risks of additional aneuploidy and genetic syndromes, are reduced by approximately 50% with normal anatomy ultrasounds in 1st/2nd trimester. Ultrasound alone does not rule out all abnormalities of genetics and development.     Maternal serum screening for aneuploidy was discussed with the patient including first trimester ZEN-A/hCG, second trimester Quad screen (either in isolation or sequential with ZEN-A) as well as non-invasive prenatal testing (NIPT) for aneuploidy from a maternal blood sample.  Positive predictive and negative predictive values for these tests were explained, questions answered. Patient understands that these are screening tests that only assesses risk for select abnormalities (trisomies 13, 18, and 21, and sex chromosome abnormalities (NIPT), as well as markers for placental health (ZEN-A) and risk for open neural tube defects (quad)).  NIPT is designed for high risk populations, but should be considered by all patients who desire the current best option for screening for applicable genetic abnormalities.     Limitations of technology discussed based on maternal age, technical aspects of tests, and maternal BMI reviewed.  All questions answered and concerns discussed.     Patient elected to proceed with NIPT previously at OB office- results low risk.

## 2025-06-04 NOTE — PATIENT INSTRUCTIONS
Your Maternity Check List   Before Arriving to the Hospital     Find an OBGYN Doctor: [email or web address]  Maternity Pre-registration for Hospital: [email or web address]  Sign up for a Hospital Tour: [email or web address]  [place] for Prenatal Classes: [email or web address]   Car seat Safety Check: [email or web address]/coalition/safe-kids-[facility]   Learn More: www.[email or web address]/health-care-services/womens-health/maternity   Download the [employer] Pregnancy Tammy: (Scan QR Code below):  See educational videos, track your pregnancy, and Mom/Baby Care videos            Resources for Depression/Anxiety  Postpartum Support International (PSI).    PSI Warmline:  0-510-724-4PPD (4080).  WWW.POSTPARTUM.NET    Mom's IMPACTT  [email or web address]/[email or web address]      Suicide & Crisis Lifeline  Dial 988    National Pregnancy Registry for Psychiatric Medications  National Pregnancy Registry for Psychiatric Medications  - [facility] (womenentalhealth.org)

## 2025-06-05 ENCOUNTER — ROUTINE PRENATAL (OUTPATIENT)
Dept: OBGYN CLINIC | Age: 25
End: 2025-06-05

## 2025-06-05 VITALS — BODY MASS INDEX: 34.16 KG/M2 | SYSTOLIC BLOOD PRESSURE: 108 MMHG | DIASTOLIC BLOOD PRESSURE: 80 MMHG | WEIGHT: 199 LBS

## 2025-06-05 DIAGNOSIS — O99.343 ANXIETY DURING PREGNANCY IN THIRD TRIMESTER, ANTEPARTUM: ICD-10-CM

## 2025-06-05 DIAGNOSIS — O99.212 OBESITY AFFECTING PREGNANCY IN SECOND TRIMESTER, UNSPECIFIED OBESITY TYPE: ICD-10-CM

## 2025-06-05 DIAGNOSIS — O24.410 DIET CONTROLLED GESTATIONAL DIABETES MELLITUS (GDM) IN THIRD TRIMESTER: ICD-10-CM

## 2025-06-05 DIAGNOSIS — O99.891 ASYMPTOMATIC BACTERIURIA DURING PREGNANCY: ICD-10-CM

## 2025-06-05 DIAGNOSIS — F41.9 ANXIETY DURING PREGNANCY IN THIRD TRIMESTER, ANTEPARTUM: ICD-10-CM

## 2025-06-05 DIAGNOSIS — R82.71 ASYMPTOMATIC BACTERIURIA DURING PREGNANCY: ICD-10-CM

## 2025-06-05 DIAGNOSIS — O09.93 HIGH-RISK PREGNANCY IN THIRD TRIMESTER: Primary | ICD-10-CM

## 2025-06-05 DIAGNOSIS — Z86.69 HISTORY OF MIGRAINE: ICD-10-CM

## 2025-06-05 PROCEDURE — 0502F SUBSEQUENT PRENATAL CARE: CPT | Performed by: OBSTETRICS & GYNECOLOGY

## 2025-06-05 NOTE — PROGRESS NOTES
OB return  Tiffanie Lanza is a 24 y.o. female   with 32w3d IUP with Estimated Date of Delivery: 25 presenting to the clinic as a routine OB.   Denies any complaints.  Patient of     Problem list reviewed and updated    Pregnancy complicated by:  1. High risk- NIPT low risk, sp MFM, normal anatomy/ echo  2. GDMA1- diet controlled  3. Anxiety- stable, no meds  4. Hx of migraines  5. Obesity    Physical Examination:  /80   Wt 90.3 kg (199 lb)   LMP 10/21/2024   BMI 34.16 kg/m²    Gen: AAOx3  Ab: soft, NTTP, gravid uterus  Skin: no edema    Plan:  --RTC 2 weeks

## 2025-06-16 ENCOUNTER — ROUTINE PRENATAL (OUTPATIENT)
Dept: OBGYN CLINIC | Age: 25
End: 2025-06-16

## 2025-06-16 VITALS — WEIGHT: 197.6 LBS | SYSTOLIC BLOOD PRESSURE: 110 MMHG | BODY MASS INDEX: 33.92 KG/M2 | DIASTOLIC BLOOD PRESSURE: 74 MMHG

## 2025-06-16 DIAGNOSIS — O99.891 ASYMPTOMATIC BACTERIURIA DURING PREGNANCY: ICD-10-CM

## 2025-06-16 DIAGNOSIS — F41.9 ANXIETY DURING PREGNANCY IN THIRD TRIMESTER, ANTEPARTUM: ICD-10-CM

## 2025-06-16 DIAGNOSIS — Z86.69 HISTORY OF MIGRAINE: ICD-10-CM

## 2025-06-16 DIAGNOSIS — R82.71 ASYMPTOMATIC BACTERIURIA DURING PREGNANCY: ICD-10-CM

## 2025-06-16 DIAGNOSIS — O09.93 HIGH-RISK PREGNANCY IN THIRD TRIMESTER: Primary | ICD-10-CM

## 2025-06-16 DIAGNOSIS — Z3A.34 34 WEEKS GESTATION OF PREGNANCY: ICD-10-CM

## 2025-06-16 DIAGNOSIS — O24.410 DIET CONTROLLED GESTATIONAL DIABETES MELLITUS (GDM) IN THIRD TRIMESTER: ICD-10-CM

## 2025-06-16 DIAGNOSIS — O99.343 ANXIETY DURING PREGNANCY IN THIRD TRIMESTER, ANTEPARTUM: ICD-10-CM

## 2025-06-16 DIAGNOSIS — O99.213 OBESITY AFFECTING PREGNANCY IN THIRD TRIMESTER, UNSPECIFIED OBESITY TYPE: ICD-10-CM

## 2025-06-16 PROCEDURE — 0502F SUBSEQUENT PRENATAL CARE: CPT | Performed by: NURSE PRACTITIONER

## 2025-06-16 NOTE — PATIENT INSTRUCTIONS
PTL/labor precautions, FMC, and pregnancy warning signs reviewed. Pt advised to call the office at [phone] or go straight to Labor and Delivery at [facility] with any of the following concerns vaginal bleeding, leaking of fluid, marline regularly Q 5-7 minutes for over an hour or not feeling the baby move.    Kick counts and pre-term labor precautions reviewed

## 2025-06-16 NOTE — PROGRESS NOTES
OB return  Tiffanie Lanza is a 24 y.o. female   with 34w0d IUP with Estimated Date of Delivery: 25 presenting to the clinic as a routine OB.     She reports experiencing \"cramping\" type pains however, denies feeling them regularly.   She denies leakage of fluid or vaginal bleeding and reports active fetal movement.     Denies HA, blurred vision or RUQ pain.     FH: 34cm  Fhts: 140s    Problem list reviewed and updated    Pregnancy complicated by:  1. High risk- NIPT low risk, sp MFM, normal anatomy/ echo    2. GDMA1- diet controlled; BG Review 6/3/25: -, FBG's 88-96, PP's 100-117. Good control. Hold testing for 4 weeks; will begin 4 days prior to next MFM visit. 2025 at [facility]: Normal anatomy/echo. Growth today appropriate EFW appropriate AC; For full details review formal ultrasound report. Sees [facility]   Begin weekly testing at 36 weeks with Primary OB. OB cc'd.     3. Anxiety- stable, no meds    4. Hx of migraines: Previously seen by Neuro in 2017  Denies recent issues with migraines    5. Obesity: A1C: 5.1    Physical Examination:  /74   Wt 89.6 kg (197 lb 9.6 oz)   LMP 10/21/2024   BMI 33.92 kg/m²    Gen: AAOx3  Ab: soft, NTTP, gravid uterus  Skin: no edema    Plan:  --Hgb: 11.8 collected on , discussed results with patient today.   --Discussed how recommended to hold testing for 4 weeks an will begin again 4 days prior to next mfm visit. She is aware of this.   --Tdap vaccine recommendations discussed with patient today, she declines receiving vaccine today.   --Encouraged increase in water intake daily to prevent dehydration.   --RTC 2 weeks for cheri. Will plan to collect GBS at that visit. Sees [facility]     PTL/labor precautions, FMC, and pregnancy warning signs reviewed. Pt advised to call the office at [phone] or go straight to Labor and Delivery at [facility] with any of the following concerns vaginal bleeding, leaking of fluid, marline regularly Q 5-7 minutes

## 2025-06-30 ENCOUNTER — ROUTINE PRENATAL (OUTPATIENT)
Dept: OBGYN CLINIC | Age: 25
End: 2025-06-30

## 2025-06-30 VITALS — SYSTOLIC BLOOD PRESSURE: 108 MMHG | DIASTOLIC BLOOD PRESSURE: 72 MMHG | BODY MASS INDEX: 34.06 KG/M2 | WEIGHT: 198.4 LBS

## 2025-06-30 DIAGNOSIS — O09.93 HIGH-RISK PREGNANCY IN THIRD TRIMESTER: ICD-10-CM

## 2025-06-30 DIAGNOSIS — R82.71 ASYMPTOMATIC BACTERIURIA DURING PREGNANCY: Primary | ICD-10-CM

## 2025-06-30 DIAGNOSIS — F41.9 ANXIETY DURING PREGNANCY IN THIRD TRIMESTER, ANTEPARTUM: ICD-10-CM

## 2025-06-30 DIAGNOSIS — O99.212 OBESITY AFFECTING PREGNANCY IN SECOND TRIMESTER, UNSPECIFIED OBESITY TYPE: ICD-10-CM

## 2025-06-30 DIAGNOSIS — O99.891 ASYMPTOMATIC BACTERIURIA DURING PREGNANCY: Primary | ICD-10-CM

## 2025-06-30 DIAGNOSIS — O24.410 DIET CONTROLLED GESTATIONAL DIABETES MELLITUS (GDM) IN THIRD TRIMESTER: ICD-10-CM

## 2025-06-30 DIAGNOSIS — Z36.85 ANTENATAL SCREENING FOR STREPTOCOCCUS B: ICD-10-CM

## 2025-06-30 DIAGNOSIS — Z86.69 HISTORY OF MIGRAINE: ICD-10-CM

## 2025-06-30 DIAGNOSIS — O99.343 ANXIETY DURING PREGNANCY IN THIRD TRIMESTER, ANTEPARTUM: ICD-10-CM

## 2025-06-30 PROCEDURE — 0502F SUBSEQUENT PRENATAL CARE: CPT | Performed by: OBSTETRICS & GYNECOLOGY

## 2025-06-30 NOTE — PROGRESS NOTES
Chief Complaint   Patient presents with    Routine Prenatal Visit     cramping     +FM, no VB, no LOF. No sx of preE.    Vitals:    25 1107   BP: 108/72         2025     8:59 AM 2025     8:43 AM 2025    11:07 AM 2025     9:52 AM 2025     8:33 AM 2025    11:33 AM 2025     9:36 AM   Weight Metrics   Weight 204 lb 200 lb 198 lb 6.4 oz 197 lb 9.6 oz 199 lb 198 lb 198 lb   BMI (Calculated) 0 kg/m2 0 kg/m2 0 kg/m2 0 kg/m2 0 kg/m2 0 kg/m2 0 kg/m2     7.893 kg (17 lb 6.4 oz)    FHTs 140s - 150s  FH 36    1. Asymptomatic bacteriuria during pregnancy    2. High-risk pregnancy in third trimester    3. Anxiety during pregnancy in third trimester, antepartum    4. History of migraine    5. Obesity affecting pregnancy in second trimester, unspecified obesity type    6. Diet controlled gestational diabetes mellitus (GDM) in third trimester    7.  screening for streptococcus B      Orders Placed This Encounter   Procedures    Culture, Strep B Screen, Vaginal/Rectal     No orders of the defined types were placed in this encounter.    25yo G1 at 36w1d for TACHO:    Reviewed PNL. Rh positive. 1hr , see next.  GDM: MFM recs weekly testing, pt has appt with them 25 thus none ordered today.  Routine OB counseling reviewed.  GBS collected  RTC 1wks for TACHO w/ weekly testing due to GDM (either NST or BPP).    Return in about 1 week (around 2025), or if symptoms worsen or fail to improve, for weekly testing.

## 2025-07-02 ENCOUNTER — ROUTINE PRENATAL (OUTPATIENT)
Dept: OBGYN CLINIC | Age: 25
End: 2025-07-02

## 2025-07-02 VITALS — SYSTOLIC BLOOD PRESSURE: 127 MMHG | DIASTOLIC BLOOD PRESSURE: 74 MMHG

## 2025-07-02 DIAGNOSIS — O09.93 HIGH-RISK PREGNANCY IN THIRD TRIMESTER: ICD-10-CM

## 2025-07-02 DIAGNOSIS — R82.71 ASYMPTOMATIC BACTERIURIA DURING PREGNANCY: Primary | ICD-10-CM

## 2025-07-02 DIAGNOSIS — Z86.69 HISTORY OF MIGRAINE: ICD-10-CM

## 2025-07-02 DIAGNOSIS — O99.891 ASYMPTOMATIC BACTERIURIA DURING PREGNANCY: Primary | ICD-10-CM

## 2025-07-02 DIAGNOSIS — Z3A.36 36 WEEKS GESTATION OF PREGNANCY: ICD-10-CM

## 2025-07-02 DIAGNOSIS — O24.410 DIET CONTROLLED GESTATIONAL DIABETES MELLITUS (GDM) IN THIRD TRIMESTER: ICD-10-CM

## 2025-07-02 DIAGNOSIS — O99.343 ANXIETY DURING PREGNANCY IN THIRD TRIMESTER, ANTEPARTUM: ICD-10-CM

## 2025-07-02 DIAGNOSIS — O99.213 OBESITY AFFECTING PREGNANCY IN THIRD TRIMESTER, UNSPECIFIED OBESITY TYPE: ICD-10-CM

## 2025-07-02 DIAGNOSIS — F41.9 ANXIETY DURING PREGNANCY IN THIRD TRIMESTER, ANTEPARTUM: ICD-10-CM

## 2025-07-02 ASSESSMENT — PATIENT HEALTH QUESTIONNAIRE - PHQ9
2. FEELING DOWN, DEPRESSED OR HOPELESS: NOT AT ALL
SUM OF ALL RESPONSES TO PHQ QUESTIONS 1-9: 0
1. LITTLE INTEREST OR PLEASURE IN DOING THINGS: NOT AT ALL
SUM OF ALL RESPONSES TO PHQ QUESTIONS 1-9: 0

## 2025-07-02 NOTE — PROGRESS NOTES
[facility] MATERNAL FETAL MEDICINE    [address] [place] [address]  P- [phone]  [phone]     M Follow-up Visit  Tiffanie Lanza (2000) is a 24 y.o.  at 36w2d with 2025, by Last Menstrual Period.     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  Support person, Nav (KIM), present today.  Expecting baby girl \"Annabel\".     Patient is scheduled to see Primary OB ([facility]) on 25.      Interval history since prior appt reviewed and chart updated as indicated.    No recent HA's.  Has BLE edema resolves with rest, no edema noted today. Denies Pre-eclamptic symptoms. No bleeding or LOF.  Has daily [place] contractions that comes and goes, resolves with breathing and walking. Reports good fetal movement.      Mood evaluated today based on discussion with pt and PHQ screen.   Mood Reassuring today  Recommend lifestyle/behavioral modifications to enhance mood (exercise, sunshine, mood apps, nutritional modifications, etc).     Reviewed gestational age precautions and activity goals/limitations; addressed normal pregnancy complaints, reassured and offered suggestions for care  Nutritional counseling as well as specific goals based on current maternal and fetal status; options for GERD, constipation, other common complaints reviewed  Reviewed gestational age appropriate preventive care regarding communicable disease transmission and vaccines as appropriate (including flu, TDaP >28wk, RSV 32-36wk (-), as well as, COVID.)  All questions answered and concerns discussed. Additional recommendations in problem list.     Exam:     Vitals:    25 1046   BP: 127/74      Applicable labs reviewed.   Appropriate examination performed as documented.    Please see formal ultrasound report under

## 2025-07-02 NOTE — PATIENT INSTRUCTIONS
Your Maternity Check List   Before Arriving to the Hospital     Find an OBGYN Doctor: [email or web address]  Maternity Pre-registration for Hospital: [email or web address]  Sign up for a Hospital Tour: [email or web address]/about-[email or web address]/classes-events  [place] for Prenatal Classes: [email or web address]/about-us/classes-events   Car seat Safety Check: https://[email or web address]/coalition/safe-kids-[facility]   Learn More: www.[employer]/health-care-services/womens-health/maternity   Download the [employer] Pregnancy Tammy: (Scan QR Code below):  See educational videos, track your pregnancy, and Mom/Baby Care videos          Resources for Depression/Anxiety  Postpartum Support International (PSI).    PSI Warmline:  5-761-334-4PPD (7638).  WWW.POSTPARTUM.NET    Mom's IMPACTT  [email or web address]-[email or web address]

## 2025-07-03 LAB
BACTERIA SPEC CULT: NORMAL
SERVICE CMNT-IMP: NORMAL

## 2025-07-07 ENCOUNTER — PROCEDURE VISIT (OUTPATIENT)
Dept: OBGYN CLINIC | Age: 25
End: 2025-07-07

## 2025-07-07 ENCOUNTER — ROUTINE PRENATAL (OUTPATIENT)
Dept: OBGYN CLINIC | Age: 25
End: 2025-07-07

## 2025-07-07 VITALS — BODY MASS INDEX: 34.33 KG/M2 | DIASTOLIC BLOOD PRESSURE: 78 MMHG | SYSTOLIC BLOOD PRESSURE: 110 MMHG | WEIGHT: 200 LBS

## 2025-07-07 DIAGNOSIS — Z86.69 HISTORY OF MIGRAINE: ICD-10-CM

## 2025-07-07 DIAGNOSIS — O09.93 HIGH-RISK PREGNANCY IN THIRD TRIMESTER: ICD-10-CM

## 2025-07-07 DIAGNOSIS — F41.9 ANXIETY DURING PREGNANCY IN THIRD TRIMESTER, ANTEPARTUM: ICD-10-CM

## 2025-07-07 DIAGNOSIS — O99.343 ANXIETY DURING PREGNANCY IN THIRD TRIMESTER, ANTEPARTUM: ICD-10-CM

## 2025-07-07 DIAGNOSIS — O09.93 HIGH-RISK PREGNANCY IN THIRD TRIMESTER: Primary | ICD-10-CM

## 2025-07-07 DIAGNOSIS — O24.410 DIET CONTROLLED GESTATIONAL DIABETES MELLITUS (GDM) IN THIRD TRIMESTER: ICD-10-CM

## 2025-07-07 DIAGNOSIS — O99.891 ASYMPTOMATIC BACTERIURIA DURING PREGNANCY: Primary | ICD-10-CM

## 2025-07-07 DIAGNOSIS — R82.71 ASYMPTOMATIC BACTERIURIA DURING PREGNANCY: ICD-10-CM

## 2025-07-07 DIAGNOSIS — O99.891 ASYMPTOMATIC BACTERIURIA DURING PREGNANCY: ICD-10-CM

## 2025-07-07 DIAGNOSIS — R82.71 ASYMPTOMATIC BACTERIURIA DURING PREGNANCY: Primary | ICD-10-CM

## 2025-07-07 DIAGNOSIS — O99.213 OBESITY AFFECTING PREGNANCY IN THIRD TRIMESTER, UNSPECIFIED OBESITY TYPE: ICD-10-CM

## 2025-07-07 PROCEDURE — 0502F SUBSEQUENT PRENATAL CARE: CPT | Performed by: OBSTETRICS & GYNECOLOGY

## 2025-07-07 NOTE — PROGRESS NOTES
Chief Complaint   Patient presents with    Pregnancy Ultrasound    Routine Prenatal Visit     Was having contraction 7 mins apart last week. Have stopped now.      +FM, no VB, no LOF. No sx of preE.    Vitals:    07/07/25 0843   BP: 110/78         7/7/2025     8:43 AM 6/30/2025    11:07 AM 6/16/2025     9:52 AM 6/5/2025     8:33 AM 5/20/2025    11:33 AM 5/5/2025     9:36 AM 4/21/2025    10:33 AM   Weight Metrics   Weight 200 lb 198 lb 6.4 oz 197 lb 9.6 oz 199 lb 198 lb 198 lb 202 lb   BMI (Calculated) 0 kg/m2 0 kg/m2 0 kg/m2 0 kg/m2 0 kg/m2 0 kg/m2 0 kg/m2     8.618 kg (19 lb)    Ultrasound today:  See scanned report for full details.   All images viewed, read and interpreted by this MD.  BPP 8/8  SAMUEL 12, SDVP 6  Cephalic  FHR 149bpm  Anterior placenta  Impression/Interpretation: Reactive fetal testing with BPP 8/8  Plan: Continue weekly testing d/t GDM  Alesha Khan MD FACOG    SVE: closed, soft, posterior, palpates approx 50% effaced    1. Asymptomatic bacteriuria during pregnancy    2. High-risk pregnancy in third trimester    3. Anxiety during pregnancy in third trimester, antepartum    4. History of migraine    5. Obesity affecting pregnancy in third trimester, unspecified obesity type    6. Diet controlled gestational diabetes mellitus (GDM) in third trimester      No orders of the defined types were placed in this encounter.    No orders of the defined types were placed in this encounter.    23yo G1 at 37w0d for TACHO:    Reviewed PNL. Rh positive. 1hr , Failed 3hr.  GDM: MFM recs weekly testing, pt has scheduled for the remainder. Pt states no longer having to check sugars.  Routine OB counseling reviewed. Kick counts reviewed.  RTC 1wks for TACHO w/ weekly testing due to GDM (either NST or BPP).    Return in about 1 week (around 7/14/2025), or if symptoms worsen or fail to improve, for TACHO w/ BPP.

## 2025-07-08 ENCOUNTER — RESULTS FOLLOW-UP (OUTPATIENT)
Dept: OBGYN CLINIC | Age: 25
End: 2025-07-08

## 2025-07-14 ENCOUNTER — ROUTINE PRENATAL (OUTPATIENT)
Dept: OBGYN CLINIC | Age: 25
End: 2025-07-14

## 2025-07-14 ENCOUNTER — PROCEDURE VISIT (OUTPATIENT)
Dept: OBGYN CLINIC | Age: 25
End: 2025-07-14

## 2025-07-14 VITALS — DIASTOLIC BLOOD PRESSURE: 76 MMHG | BODY MASS INDEX: 35.02 KG/M2 | SYSTOLIC BLOOD PRESSURE: 128 MMHG | WEIGHT: 204 LBS

## 2025-07-14 DIAGNOSIS — O24.410 DIET CONTROLLED GESTATIONAL DIABETES MELLITUS (GDM) IN THIRD TRIMESTER: ICD-10-CM

## 2025-07-14 DIAGNOSIS — O99.213 OBESITY AFFECTING PREGNANCY IN THIRD TRIMESTER, UNSPECIFIED OBESITY TYPE: ICD-10-CM

## 2025-07-14 DIAGNOSIS — O09.93 HIGH-RISK PREGNANCY IN THIRD TRIMESTER: Primary | ICD-10-CM

## 2025-07-14 DIAGNOSIS — F41.9 ANXIETY DURING PREGNANCY IN THIRD TRIMESTER, ANTEPARTUM: ICD-10-CM

## 2025-07-14 DIAGNOSIS — O99.891 ASYMPTOMATIC BACTERIURIA DURING PREGNANCY: Primary | ICD-10-CM

## 2025-07-14 DIAGNOSIS — O99.343 ANXIETY DURING PREGNANCY IN THIRD TRIMESTER, ANTEPARTUM: ICD-10-CM

## 2025-07-14 DIAGNOSIS — O09.93 HIGH-RISK PREGNANCY IN THIRD TRIMESTER: ICD-10-CM

## 2025-07-14 DIAGNOSIS — R82.71 ASYMPTOMATIC BACTERIURIA DURING PREGNANCY: Primary | ICD-10-CM

## 2025-07-14 DIAGNOSIS — Z86.69 HISTORY OF MIGRAINE: ICD-10-CM

## 2025-07-14 PROCEDURE — 0502F SUBSEQUENT PRENATAL CARE: CPT | Performed by: OBSTETRICS & GYNECOLOGY

## 2025-07-14 NOTE — PROGRESS NOTES
Chief Complaint   Patient presents with    Pregnancy Ultrasound    Routine Prenatal Visit     Having a little cramping     +FM, no VB, no LOF. No sx of preE.    Vitals:    07/14/25 0859   BP: 128/76         7/14/2025     8:59 AM 7/7/2025     8:43 AM 6/30/2025    11:07 AM 6/16/2025     9:52 AM 6/5/2025     8:33 AM 5/20/2025    11:33 AM 5/5/2025     9:36 AM   Weight Metrics   Weight 204 lb 200 lb 198 lb 6.4 oz 197 lb 9.6 oz 199 lb 198 lb 198 lb   BMI (Calculated) 0 kg/m2 0 kg/m2 0 kg/m2 0 kg/m2 0 kg/m2 0 kg/m2 0 kg/m2     10.4 kg (23 lb)    Ultrasound today:  See scanned report for full details.   All images viewed, read and interpreted by this MD.  BPP 8/8  SAMUEL 14, SDVP 4.5  Cephalic   bpm  Anterior placenta  Impression/Interpretation: Reactive fetal testing with BPP 8/8  Plan: Continue weekly testing d/t GDM  Alesha Khan MD FACOG    SVE: closed, soft, posterior, palpates approx 50% effaced. Slightly less posterior than last week,    1. Asymptomatic bacteriuria during pregnancy    2. High-risk pregnancy in third trimester    3. Anxiety during pregnancy in third trimester, antepartum    4. History of migraine    5. Obesity affecting pregnancy in third trimester, unspecified obesity type    6. Diet controlled gestational diabetes mellitus (GDM) in third trimester      No orders of the defined types were placed in this encounter.    No orders of the defined types were placed in this encounter.    25yo G1 at 38w0d for TACHO:    Reviewed PNL. Rh positive. 1hr , Failed 3hr.  GDM: MFM recs weekly testing, pt has scheduled for the remainder. Pt states no longer having to check sugars.  Routine OB counseling reviewed. Kick counts reviewed.  GBS negative  BPP 8/8, discussed as interpreted above.  Desires to await spontaneous labor for now.  RTC 1wks for TACHO w/ weekly testing due to GDM (either NST or BPP).    Return in about 1 week (around 7/21/2025), or if symptoms worsen or fail to improve, for TACHO w/

## 2025-07-21 ENCOUNTER — PROCEDURE VISIT (OUTPATIENT)
Dept: OBGYN CLINIC | Age: 25
End: 2025-07-21
Payer: COMMERCIAL

## 2025-07-21 ENCOUNTER — ROUTINE PRENATAL (OUTPATIENT)
Dept: OBGYN CLINIC | Age: 25
End: 2025-07-21

## 2025-07-21 VITALS — WEIGHT: 205 LBS | DIASTOLIC BLOOD PRESSURE: 68 MMHG | BODY MASS INDEX: 35.19 KG/M2 | SYSTOLIC BLOOD PRESSURE: 110 MMHG

## 2025-07-21 DIAGNOSIS — F41.9 ANXIETY DURING PREGNANCY IN THIRD TRIMESTER, ANTEPARTUM: ICD-10-CM

## 2025-07-21 DIAGNOSIS — O99.213 OBESITY AFFECTING PREGNANCY IN THIRD TRIMESTER, UNSPECIFIED OBESITY TYPE: ICD-10-CM

## 2025-07-21 DIAGNOSIS — O99.891 ASYMPTOMATIC BACTERIURIA DURING PREGNANCY: Primary | ICD-10-CM

## 2025-07-21 DIAGNOSIS — R82.71 ASYMPTOMATIC BACTERIURIA DURING PREGNANCY: Primary | ICD-10-CM

## 2025-07-21 DIAGNOSIS — O24.410 DIET CONTROLLED GESTATIONAL DIABETES MELLITUS (GDM) IN THIRD TRIMESTER: Primary | ICD-10-CM

## 2025-07-21 DIAGNOSIS — Z86.69 HISTORY OF MIGRAINE: ICD-10-CM

## 2025-07-21 DIAGNOSIS — O09.93 HIGH-RISK PREGNANCY IN THIRD TRIMESTER: ICD-10-CM

## 2025-07-21 DIAGNOSIS — O99.343 ANXIETY DURING PREGNANCY IN THIRD TRIMESTER, ANTEPARTUM: ICD-10-CM

## 2025-07-21 DIAGNOSIS — O24.410 DIET CONTROLLED GESTATIONAL DIABETES MELLITUS (GDM) IN THIRD TRIMESTER: ICD-10-CM

## 2025-07-21 PROCEDURE — 76819 FETAL BIOPHYS PROFIL W/O NST: CPT | Performed by: OBSTETRICS & GYNECOLOGY

## 2025-07-21 PROCEDURE — 0502F SUBSEQUENT PRENATAL CARE: CPT | Performed by: OBSTETRICS & GYNECOLOGY

## 2025-07-27 NOTE — PROGRESS NOTES
OB return  Tiffanie Lanza is a 24 y.o. female   with 40w0d IUP with Estimated Date of Delivery: 25 presenting to the clinic as a routine OB with BPP.     Indications for BPP: 40 weeks gestation  U/s findings: EFW 4142g (9#2oz) Overall: 92%, AC: 86.8%, HC: 45.5%, SAMUEL: 12.22cm, FHR: 136bpm, BPP:   Anterior placenta noted  Cephalic presentation seen  Dr. Khan also reviewed u/s findings    She reports experiencing \"cramping\" type pains however, denies feeling them regularly.   She denies leakage of fluid or vaginal bleeding and reports active fetal movement.     Denies HA, blurred vision or RUQ pain.     Problem list reviewed and updated    Pregnancy complicated by:  1. High risk- NIPT low risk, sp MFM, normal anatomy/ echo     2. GDMA1- diet controlled; BG Review by CDE 2025:  - , FBG's 90-97. PP's , good control. Pt states no longer having to check sugars. 25 [facility]: Reassuring fetal status. Growth today appropriate EFW appropriate AC; For full details review formal ultrasound report.   Begin weekly testing at 36 weeks with Primary OB. OB cc'd.      3. Anxiety- stable, no meds     4. Hx of migraines: Previously seen by Neuro in 2017  Denies recent issues with migraines     5. Obesity: A1C: 5.1      Physical Examination:  /72   Wt 93 kg (205 lb)   LMP 10/21/2024   BMI 35.19 kg/m²    Gen: AAOx3  Ab: soft, NTTP, gravid uterus  Skin: no edema  SVE: ft/thick/high    Plan:  --U/s findings discussed with patient today, overall fetal reassurance. EFW 4142g (9#2oz) Overall: 92%, AC: 86.8%, HC: 45.5%, SAMUEL: 12.22cm, FHR: 136bpm, BPP: 8/8 Discussed with patient.   --We discuss IOL and also discussed concerns for LGA baby including but not limited to: shoulder dystocia. Lengthy discussion that baby will continue growing and potentially be bigger at delivery which could increase above risks. She is understanding of these risks. She wishes to allow labor to progress on its own at this

## 2025-07-28 ENCOUNTER — PROCEDURE VISIT (OUTPATIENT)
Dept: OBGYN CLINIC | Age: 25
End: 2025-07-28
Payer: COMMERCIAL

## 2025-07-28 ENCOUNTER — ROUTINE PRENATAL (OUTPATIENT)
Dept: OBGYN CLINIC | Age: 25
End: 2025-07-28

## 2025-07-28 VITALS — DIASTOLIC BLOOD PRESSURE: 72 MMHG | SYSTOLIC BLOOD PRESSURE: 124 MMHG | WEIGHT: 205 LBS | BODY MASS INDEX: 35.19 KG/M2

## 2025-07-28 DIAGNOSIS — O99.213 OBESITY AFFECTING PREGNANCY IN THIRD TRIMESTER, UNSPECIFIED OBESITY TYPE: ICD-10-CM

## 2025-07-28 DIAGNOSIS — Z3A.40 40 WEEKS GESTATION OF PREGNANCY: ICD-10-CM

## 2025-07-28 DIAGNOSIS — F41.9 ANXIETY DURING PREGNANCY IN THIRD TRIMESTER, ANTEPARTUM: ICD-10-CM

## 2025-07-28 DIAGNOSIS — O99.891 ASYMPTOMATIC BACTERIURIA DURING PREGNANCY: ICD-10-CM

## 2025-07-28 DIAGNOSIS — O99.343 ANXIETY DURING PREGNANCY IN THIRD TRIMESTER, ANTEPARTUM: ICD-10-CM

## 2025-07-28 DIAGNOSIS — O24.410 DIET CONTROLLED GESTATIONAL DIABETES MELLITUS (GDM) IN THIRD TRIMESTER: ICD-10-CM

## 2025-07-28 DIAGNOSIS — O09.93 HIGH-RISK PREGNANCY IN THIRD TRIMESTER: Primary | ICD-10-CM

## 2025-07-28 DIAGNOSIS — R82.71 ASYMPTOMATIC BACTERIURIA DURING PREGNANCY: ICD-10-CM

## 2025-07-28 DIAGNOSIS — Z86.69 HISTORY OF MIGRAINE: ICD-10-CM

## 2025-07-28 PROCEDURE — 0502F SUBSEQUENT PRENATAL CARE: CPT | Performed by: NURSE PRACTITIONER

## 2025-07-28 PROCEDURE — 76816 OB US FOLLOW-UP PER FETUS: CPT | Performed by: OBSTETRICS & GYNECOLOGY

## 2025-07-28 PROCEDURE — 76819 FETAL BIOPHYS PROFIL W/O NST: CPT | Performed by: OBSTETRICS & GYNECOLOGY

## 2025-07-28 NOTE — PATIENT INSTRUCTIONS
PTL/labor precautions, FMC, and pregnancy warning signs reviewed. Pt advised to call the office at [phone] or go straight to Labor and Delivery at [facility] with any of the following concerns vaginal bleeding, leaking of fluid, marline regularly Q 5-7 minutes for over an hour or not feeling the baby move.     Kick counts and labor precautions reviewed

## 2025-08-03 ENCOUNTER — HOSPITAL ENCOUNTER (OUTPATIENT)
Age: 25
Discharge: HOME | End: 2025-08-03
Attending: OBSTETRICS & GYNECOLOGY | Admitting: OBSTETRICS & GYNECOLOGY
Payer: COMMERCIAL

## 2025-08-03 VITALS
HEART RATE: 110 BPM | SYSTOLIC BLOOD PRESSURE: 130 MMHG | TEMPERATURE: 98.2 F | DIASTOLIC BLOOD PRESSURE: 82 MMHG | RESPIRATION RATE: 17 BRPM

## 2025-08-03 PROBLEM — O26.853 SPOTTING COMPLICATING PREGNANCY, ANTEPARTUM, THIRD TRIMESTER: Status: ACTIVE | Noted: 2025-08-03

## 2025-08-03 PROBLEM — O26.853 SPOTTING COMPLICATING PREGNANCY, ANTEPARTUM, THIRD TRIMESTER: Status: RESOLVED | Noted: 2025-08-03 | Resolved: 2025-08-03

## 2025-08-03 PROCEDURE — 99283 EMERGENCY DEPT VISIT LOW MDM: CPT

## 2025-08-04 ENCOUNTER — ROUTINE PRENATAL (OUTPATIENT)
Dept: OBGYN CLINIC | Age: 25
End: 2025-08-04
Payer: COMMERCIAL

## 2025-08-04 ENCOUNTER — HOSPITAL ENCOUNTER (INPATIENT)
Age: 25
LOS: 3 days | Discharge: HOME | End: 2025-08-07
Attending: OBSTETRICS & GYNECOLOGY | Admitting: OBSTETRICS & GYNECOLOGY
Payer: COMMERCIAL

## 2025-08-04 ENCOUNTER — PROCEDURE VISIT (OUTPATIENT)
Dept: OBGYN CLINIC | Age: 25
End: 2025-08-04
Payer: COMMERCIAL

## 2025-08-04 ENCOUNTER — APPOINTMENT (OUTPATIENT)
Dept: LABOR AND DELIVERY | Age: 25
End: 2025-08-04
Payer: COMMERCIAL

## 2025-08-04 VITALS — DIASTOLIC BLOOD PRESSURE: 82 MMHG | WEIGHT: 206 LBS | BODY MASS INDEX: 35.36 KG/M2 | SYSTOLIC BLOOD PRESSURE: 118 MMHG

## 2025-08-04 DIAGNOSIS — O24.410 DIET CONTROLLED GESTATIONAL DIABETES MELLITUS (GDM) IN THIRD TRIMESTER: ICD-10-CM

## 2025-08-04 DIAGNOSIS — O99.343 ANXIETY DURING PREGNANCY IN THIRD TRIMESTER, ANTEPARTUM: ICD-10-CM

## 2025-08-04 DIAGNOSIS — R82.71 ASYMPTOMATIC BACTERIURIA DURING PREGNANCY: Primary | ICD-10-CM

## 2025-08-04 DIAGNOSIS — O99.213 OBESITY AFFECTING PREGNANCY IN THIRD TRIMESTER, UNSPECIFIED OBESITY TYPE: ICD-10-CM

## 2025-08-04 DIAGNOSIS — O09.93 HIGH-RISK PREGNANCY IN THIRD TRIMESTER: ICD-10-CM

## 2025-08-04 DIAGNOSIS — O48.0 POST-TERM PREGNANCY, 40-42 WEEKS OF GESTATION: Primary | ICD-10-CM

## 2025-08-04 DIAGNOSIS — O99.891 ASYMPTOMATIC BACTERIURIA DURING PREGNANCY: Primary | ICD-10-CM

## 2025-08-04 DIAGNOSIS — F41.9 ANXIETY DURING PREGNANCY IN THIRD TRIMESTER, ANTEPARTUM: ICD-10-CM

## 2025-08-04 DIAGNOSIS — Z86.69 HISTORY OF MIGRAINE: ICD-10-CM

## 2025-08-04 LAB
ABO + RH BLD: NORMAL
BLOOD GROUP ANTIBODIES SERPL: NORMAL
ERYTHROCYTE [DISTWIDTH] IN BLOOD BY AUTOMATED COUNT: 14 % (ref 11.9–14.6)
GLUCOSE BLD STRIP.AUTO-MCNC: 109 MG/DL (ref 65–100)
HCT VFR BLD AUTO: 40.2 % (ref 35.8–46.3)
HGB BLD-MCNC: 13.7 G/DL (ref 11.7–15.4)
MCH RBC QN AUTO: 29.5 PG (ref 26.1–32.9)
MCHC RBC AUTO-ENTMCNC: 34.1 G/DL (ref 31.4–35)
MCV RBC AUTO: 86.5 FL (ref 82–102)
NRBC # BLD: 0 K/UL (ref 0–0.2)
PLATELET # BLD AUTO: 170 K/UL (ref 150–450)
PMV BLD AUTO: 11.1 FL (ref 9.4–12.3)
RBC # BLD AUTO: 4.65 M/UL (ref 4.05–5.2)
SERVICE CMNT-IMP: ABNORMAL
SPECIMEN EXP DATE BLD: NORMAL
WBC # BLD AUTO: 13.8 K/UL (ref 4.3–11.1)

## 2025-08-04 PROCEDURE — 86900 BLOOD TYPING SEROLOGIC ABO: CPT

## 2025-08-04 PROCEDURE — 82962 GLUCOSE BLOOD TEST: CPT

## 2025-08-04 PROCEDURE — 59426 ANTEPARTUM CARE ONLY: CPT | Performed by: OBSTETRICS & GYNECOLOGY

## 2025-08-04 PROCEDURE — 85027 COMPLETE CBC AUTOMATED: CPT

## 2025-08-04 PROCEDURE — 1100000000 HC RM PRIVATE

## 2025-08-04 PROCEDURE — 76819 FETAL BIOPHYS PROFIL W/O NST: CPT | Performed by: OBSTETRICS & GYNECOLOGY

## 2025-08-04 PROCEDURE — 86901 BLOOD TYPING SEROLOGIC RH(D): CPT

## 2025-08-04 PROCEDURE — 86850 RBC ANTIBODY SCREEN: CPT

## 2025-08-04 PROCEDURE — 6370000000 HC RX 637 (ALT 250 FOR IP): Performed by: OBSTETRICS & GYNECOLOGY

## 2025-08-04 RX ORDER — SODIUM CHLORIDE 0.9 % (FLUSH) 0.9 %
5-40 SYRINGE (ML) INJECTION EVERY 12 HOURS SCHEDULED
Status: DISCONTINUED | OUTPATIENT
Start: 2025-08-04 | End: 2025-08-05

## 2025-08-04 RX ORDER — ONDANSETRON 2 MG/ML
4 INJECTION INTRAMUSCULAR; INTRAVENOUS EVERY 6 HOURS PRN
Status: DISCONTINUED | OUTPATIENT
Start: 2025-08-04 | End: 2025-08-05

## 2025-08-04 RX ORDER — MISOPROSTOL 100 UG/1
400 TABLET ORAL PRN
Status: CANCELLED | OUTPATIENT
Start: 2025-08-04

## 2025-08-04 RX ORDER — DEXTROSE, SODIUM CHLORIDE, SODIUM LACTATE, POTASSIUM CHLORIDE, AND CALCIUM CHLORIDE 5; .6; .31; .03; .02 G/100ML; G/100ML; G/100ML; G/100ML; G/100ML
INJECTION, SOLUTION INTRAVENOUS CONTINUOUS
Status: CANCELLED | OUTPATIENT
Start: 2025-08-04

## 2025-08-04 RX ORDER — TERBUTALINE SULFATE 1 MG/ML
0.25 INJECTION SUBCUTANEOUS ONCE
Status: DISCONTINUED | OUTPATIENT
Start: 2025-08-04 | End: 2025-08-05

## 2025-08-04 RX ORDER — SODIUM CHLORIDE 0.9 % (FLUSH) 0.9 %
5-40 SYRINGE (ML) INJECTION PRN
Status: CANCELLED | OUTPATIENT
Start: 2025-08-04

## 2025-08-04 RX ORDER — SODIUM CHLORIDE, SODIUM LACTATE, POTASSIUM CHLORIDE, AND CALCIUM CHLORIDE .6; .31; .03; .02 G/100ML; G/100ML; G/100ML; G/100ML
500 INJECTION, SOLUTION INTRAVENOUS PRN
Status: CANCELLED | OUTPATIENT
Start: 2025-08-04

## 2025-08-04 RX ORDER — CARBOPROST TROMETHAMINE 250 UG/ML
250 INJECTION, SOLUTION INTRAMUSCULAR PRN
Status: CANCELLED | OUTPATIENT
Start: 2025-08-04

## 2025-08-04 RX ORDER — SODIUM CHLORIDE 0.9 % (FLUSH) 0.9 %
5-40 SYRINGE (ML) INJECTION PRN
Status: DISCONTINUED | OUTPATIENT
Start: 2025-08-04 | End: 2025-08-05

## 2025-08-04 RX ORDER — SODIUM CHLORIDE 0.9 % (FLUSH) 0.9 %
5-40 SYRINGE (ML) INJECTION EVERY 12 HOURS SCHEDULED
Status: CANCELLED | OUTPATIENT
Start: 2025-08-04

## 2025-08-04 RX ORDER — ACETAMINOPHEN 325 MG/1
650 TABLET ORAL EVERY 4 HOURS PRN
Status: DISCONTINUED | OUTPATIENT
Start: 2025-08-04 | End: 2025-08-05

## 2025-08-04 RX ORDER — TERBUTALINE SULFATE 1 MG/ML
0.25 INJECTION SUBCUTANEOUS ONCE
Status: CANCELLED | OUTPATIENT
Start: 2025-08-04 | End: 2025-08-04

## 2025-08-04 RX ORDER — SODIUM CHLORIDE 9 MG/ML
INJECTION, SOLUTION INTRAVENOUS PRN
Status: CANCELLED | OUTPATIENT
Start: 2025-08-04

## 2025-08-04 RX ORDER — ONDANSETRON 2 MG/ML
4 INJECTION INTRAMUSCULAR; INTRAVENOUS EVERY 6 HOURS PRN
Status: CANCELLED | OUTPATIENT
Start: 2025-08-04

## 2025-08-04 RX ORDER — LOPERAMIDE HYDROCHLORIDE 2 MG/1
2 CAPSULE ORAL PRN
Status: CANCELLED | OUTPATIENT
Start: 2025-08-04

## 2025-08-04 RX ORDER — SODIUM CHLORIDE, SODIUM LACTATE, POTASSIUM CHLORIDE, AND CALCIUM CHLORIDE .6; .31; .03; .02 G/100ML; G/100ML; G/100ML; G/100ML
1000 INJECTION, SOLUTION INTRAVENOUS PRN
Status: CANCELLED | OUTPATIENT
Start: 2025-08-04

## 2025-08-04 RX ORDER — BUTORPHANOL TARTRATE 2 MG/ML
1 INJECTION, SOLUTION INTRAMUSCULAR; INTRAVENOUS
Status: DISCONTINUED | OUTPATIENT
Start: 2025-08-04 | End: 2025-08-05

## 2025-08-04 RX ORDER — BUTORPHANOL TARTRATE 1 MG/ML
1 INJECTION, SOLUTION INTRAMUSCULAR; INTRAVENOUS
Status: CANCELLED | OUTPATIENT
Start: 2025-08-04

## 2025-08-04 RX ORDER — METHYLERGONOVINE MALEATE 0.2 MG/ML
200 INJECTION INTRAVENOUS PRN
Status: CANCELLED | OUTPATIENT
Start: 2025-08-04

## 2025-08-04 RX ORDER — ACETAMINOPHEN 325 MG/1
650 TABLET ORAL EVERY 4 HOURS PRN
Status: CANCELLED | OUTPATIENT
Start: 2025-08-04

## 2025-08-04 RX ORDER — SODIUM CHLORIDE 9 MG/ML
INJECTION, SOLUTION INTRAVENOUS PRN
Status: DISCONTINUED | OUTPATIENT
Start: 2025-08-04 | End: 2025-08-05

## 2025-08-04 RX ORDER — ONDANSETRON 4 MG/1
4 TABLET, ORALLY DISINTEGRATING ORAL EVERY 6 HOURS PRN
Status: CANCELLED | OUTPATIENT
Start: 2025-08-04

## 2025-08-04 RX ORDER — ONDANSETRON 4 MG/1
4 TABLET, ORALLY DISINTEGRATING ORAL EVERY 6 HOURS PRN
Status: DISCONTINUED | OUTPATIENT
Start: 2025-08-04 | End: 2025-08-05

## 2025-08-04 RX ADMIN — Medication 50 MCG: at 20:11

## 2025-08-05 ENCOUNTER — ANESTHESIA EVENT (OUTPATIENT)
Dept: OPERATING ROOM | Age: 25
End: 2025-08-05
Payer: COMMERCIAL

## 2025-08-05 ENCOUNTER — ANESTHESIA (OUTPATIENT)
Dept: OPERATING ROOM | Age: 25
End: 2025-08-05
Payer: COMMERCIAL

## 2025-08-05 PROBLEM — O48.0 POST-DATES PREGNANCY: Status: ACTIVE | Noted: 2025-08-05

## 2025-08-05 LAB
GLUCOSE BLD STRIP.AUTO-MCNC: 109 MG/DL (ref 65–100)
GLUCOSE BLD STRIP.AUTO-MCNC: 91 MG/DL (ref 65–100)
GLUCOSE BLD STRIP.AUTO-MCNC: 96 MG/DL (ref 65–100)
SERVICE CMNT-IMP: ABNORMAL
SERVICE CMNT-IMP: NORMAL
SERVICE CMNT-IMP: NORMAL

## 2025-08-05 PROCEDURE — 2580000003 HC RX 258: Performed by: OBSTETRICS & GYNECOLOGY

## 2025-08-05 PROCEDURE — 6370000000 HC RX 637 (ALT 250 FOR IP): Performed by: ANESTHESIOLOGY

## 2025-08-05 PROCEDURE — 3700000025 EPIDURAL BLOCK: Performed by: ANESTHESIOLOGY

## 2025-08-05 PROCEDURE — 6360000002 HC RX W HCPCS: Performed by: ANESTHESIOLOGY

## 2025-08-05 PROCEDURE — 3609079900 HC CESAREAN SECTION: Performed by: OBSTETRICS & GYNECOLOGY

## 2025-08-05 PROCEDURE — 59515 CESAREAN DELIVERY: CPT | Performed by: OBSTETRICS & GYNECOLOGY

## 2025-08-05 PROCEDURE — 6360000002 HC RX W HCPCS: Performed by: NURSE ANESTHETIST, CERTIFIED REGISTERED

## 2025-08-05 PROCEDURE — 2580000003 HC RX 258: Performed by: NURSE ANESTHETIST, CERTIFIED REGISTERED

## 2025-08-05 PROCEDURE — 2500000003 HC RX 250 WO HCPCS: Performed by: OBSTETRICS & GYNECOLOGY

## 2025-08-05 PROCEDURE — 6360000002 HC RX W HCPCS: Performed by: OBSTETRICS & GYNECOLOGY

## 2025-08-05 PROCEDURE — 3700000000 HC ANESTHESIA ATTENDED CARE: Performed by: OBSTETRICS & GYNECOLOGY

## 2025-08-05 PROCEDURE — 7100000000 HC PACU RECOVERY - FIRST 15 MIN: Performed by: OBSTETRICS & GYNECOLOGY

## 2025-08-05 PROCEDURE — 3E033VJ INTRODUCTION OF OTHER HORMONE INTO PERIPHERAL VEIN, PERCUTANEOUS APPROACH: ICD-10-PCS | Performed by: OBSTETRICS & GYNECOLOGY

## 2025-08-05 PROCEDURE — 2709999900 HC NON-CHARGEABLE SUPPLY: Performed by: OBSTETRICS & GYNECOLOGY

## 2025-08-05 PROCEDURE — 82962 GLUCOSE BLOOD TEST: CPT

## 2025-08-05 PROCEDURE — 3E0P7VZ INTRODUCTION OF HORMONE INTO FEMALE REPRODUCTIVE, VIA NATURAL OR ARTIFICIAL OPENING: ICD-10-PCS | Performed by: OBSTETRICS & GYNECOLOGY

## 2025-08-05 PROCEDURE — 2500000003 HC RX 250 WO HCPCS: Performed by: NURSE ANESTHETIST, CERTIFIED REGISTERED

## 2025-08-05 PROCEDURE — 2580000003 HC RX 258: Performed by: ANESTHESIOLOGY

## 2025-08-05 PROCEDURE — 7210000100 HC LABOR FEE PER 1 HR

## 2025-08-05 PROCEDURE — 4A1HXCZ MONITORING OF PRODUCTS OF CONCEPTION, CARDIAC RATE, EXTERNAL APPROACH: ICD-10-PCS | Performed by: OBSTETRICS & GYNECOLOGY

## 2025-08-05 PROCEDURE — 00HU33Z INSERTION OF INFUSION DEVICE INTO SPINAL CANAL, PERCUTANEOUS APPROACH: ICD-10-PCS | Performed by: ANESTHESIOLOGY

## 2025-08-05 PROCEDURE — 1100000000 HC RM PRIVATE

## 2025-08-05 PROCEDURE — 6370000000 HC RX 637 (ALT 250 FOR IP): Performed by: OBSTETRICS & GYNECOLOGY

## 2025-08-05 PROCEDURE — 10H07YZ INSERTION OF OTHER DEVICE INTO PRODUCTS OF CONCEPTION, VIA NATURAL OR ARTIFICIAL OPENING: ICD-10-PCS | Performed by: OBSTETRICS & GYNECOLOGY

## 2025-08-05 PROCEDURE — 7100000001 HC PACU RECOVERY - ADDTL 15 MIN: Performed by: OBSTETRICS & GYNECOLOGY

## 2025-08-05 PROCEDURE — 3700000001 HC ADD 15 MINUTES (ANESTHESIA): Performed by: OBSTETRICS & GYNECOLOGY

## 2025-08-05 RX ORDER — ONDANSETRON 2 MG/ML
4 INJECTION INTRAMUSCULAR; INTRAVENOUS EVERY 6 HOURS PRN
Status: DISCONTINUED | OUTPATIENT
Start: 2025-08-05 | End: 2025-08-06

## 2025-08-05 RX ORDER — FENTANYL CITRATE 50 UG/ML
INJECTION, SOLUTION INTRAMUSCULAR; INTRAVENOUS
Status: DISCONTINUED | OUTPATIENT
Start: 2025-08-05 | End: 2025-08-05 | Stop reason: SDUPTHER

## 2025-08-05 RX ORDER — SODIUM CHLORIDE, SODIUM LACTATE, POTASSIUM CHLORIDE, AND CALCIUM CHLORIDE .6; .31; .03; .02 G/100ML; G/100ML; G/100ML; G/100ML
500 INJECTION, SOLUTION INTRAVENOUS PRN
Status: DISCONTINUED | OUTPATIENT
Start: 2025-08-05 | End: 2025-08-05

## 2025-08-05 RX ORDER — MISOPROSTOL 200 UG/1
400 TABLET ORAL PRN
Status: DISCONTINUED | OUTPATIENT
Start: 2025-08-05 | End: 2025-08-05

## 2025-08-05 RX ORDER — HYDROMORPHONE HYDROCHLORIDE 1 MG/ML
1 INJECTION, SOLUTION INTRAMUSCULAR; INTRAVENOUS; SUBCUTANEOUS EVERY 4 HOURS PRN
Status: ACTIVE | OUTPATIENT
Start: 2025-08-05 | End: 2025-08-06

## 2025-08-05 RX ORDER — SODIUM CHLORIDE 9 MG/ML
INJECTION, SOLUTION INTRAVENOUS PRN
Status: DISCONTINUED | OUTPATIENT
Start: 2025-08-05 | End: 2025-08-06

## 2025-08-05 RX ORDER — SODIUM CHLORIDE 0.9 % (FLUSH) 0.9 %
5-40 SYRINGE (ML) INJECTION EVERY 12 HOURS SCHEDULED
Status: DISCONTINUED | OUTPATIENT
Start: 2025-08-05 | End: 2025-08-06

## 2025-08-05 RX ORDER — MORPHINE SULFATE 0.5 MG/ML
INJECTION, SOLUTION EPIDURAL; INTRATHECAL; INTRAVENOUS
Status: DISCONTINUED | OUTPATIENT
Start: 2025-08-05 | End: 2025-08-05 | Stop reason: SDUPTHER

## 2025-08-05 RX ORDER — SODIUM CHLORIDE, SODIUM LACTATE, POTASSIUM CHLORIDE, AND CALCIUM CHLORIDE .6; .31; .03; .02 G/100ML; G/100ML; G/100ML; G/100ML
1000 INJECTION, SOLUTION INTRAVENOUS PRN
Status: DISCONTINUED | OUTPATIENT
Start: 2025-08-05 | End: 2025-08-06

## 2025-08-05 RX ORDER — LIDOCAINE HYDROCHLORIDE 10 MG/ML
1 INJECTION, SOLUTION INFILTRATION; PERINEURAL
Status: DISCONTINUED | OUTPATIENT
Start: 2025-08-05 | End: 2025-08-06

## 2025-08-05 RX ORDER — TRANEXAMIC ACID 10 MG/ML
1000 INJECTION, SOLUTION INTRAVENOUS
Status: DISCONTINUED | OUTPATIENT
Start: 2025-08-05 | End: 2025-08-05

## 2025-08-05 RX ORDER — KETOROLAC TROMETHAMINE 30 MG/ML
30 INJECTION, SOLUTION INTRAMUSCULAR; INTRAVENOUS EVERY 6 HOURS PRN
Status: DISCONTINUED | OUTPATIENT
Start: 2025-08-06 | End: 2025-08-06

## 2025-08-05 RX ORDER — CARBOPROST TROMETHAMINE 250 UG/ML
250 INJECTION, SOLUTION INTRAMUSCULAR PRN
Status: DISCONTINUED | OUTPATIENT
Start: 2025-08-05 | End: 2025-08-06

## 2025-08-05 RX ORDER — SODIUM CHLORIDE, SODIUM LACTATE, POTASSIUM CHLORIDE, CALCIUM CHLORIDE 600; 310; 30; 20 MG/100ML; MG/100ML; MG/100ML; MG/100ML
INJECTION, SOLUTION INTRAVENOUS CONTINUOUS
Status: DISCONTINUED | OUTPATIENT
Start: 2025-08-05 | End: 2025-08-06

## 2025-08-05 RX ORDER — FAMOTIDINE 20 MG/1
20 TABLET, FILM COATED ORAL 2 TIMES DAILY PRN
Status: DISCONTINUED | OUTPATIENT
Start: 2025-08-05 | End: 2025-08-06

## 2025-08-05 RX ORDER — KETOROLAC TROMETHAMINE 30 MG/ML
INJECTION, SOLUTION INTRAMUSCULAR; INTRAVENOUS
Status: DISCONTINUED | OUTPATIENT
Start: 2025-08-05 | End: 2025-08-05 | Stop reason: SDUPTHER

## 2025-08-05 RX ORDER — SODIUM CHLORIDE 0.9 % (FLUSH) 0.9 %
5-40 SYRINGE (ML) INJECTION PRN
Status: DISCONTINUED | OUTPATIENT
Start: 2025-08-05 | End: 2025-08-06

## 2025-08-05 RX ORDER — OXYCODONE HYDROCHLORIDE 5 MG/1
5 TABLET ORAL EVERY 4 HOURS PRN
Status: DISCONTINUED | OUTPATIENT
Start: 2025-08-05 | End: 2025-08-06

## 2025-08-05 RX ORDER — DEXTROSE, SODIUM CHLORIDE, SODIUM LACTATE, POTASSIUM CHLORIDE, AND CALCIUM CHLORIDE 5; .6; .31; .03; .02 G/100ML; G/100ML; G/100ML; G/100ML; G/100ML
INJECTION, SOLUTION INTRAVENOUS CONTINUOUS
Status: DISCONTINUED | OUTPATIENT
Start: 2025-08-05 | End: 2025-08-06

## 2025-08-05 RX ORDER — TRANEXAMIC ACID 10 MG/ML
1000 INJECTION, SOLUTION INTRAVENOUS
Status: DISCONTINUED | OUTPATIENT
Start: 2025-08-05 | End: 2025-08-06

## 2025-08-05 RX ORDER — MISOPROSTOL 200 UG/1
400 TABLET ORAL PRN
OUTPATIENT
Start: 2025-08-05

## 2025-08-05 RX ORDER — BUTORPHANOL TARTRATE 2 MG/ML
1 INJECTION, SOLUTION INTRAMUSCULAR; INTRAVENOUS
Status: COMPLETED | OUTPATIENT
Start: 2025-08-05 | End: 2025-08-05

## 2025-08-05 RX ORDER — SODIUM CHLORIDE, SODIUM LACTATE, POTASSIUM CHLORIDE, AND CALCIUM CHLORIDE .6; .31; .03; .02 G/100ML; G/100ML; G/100ML; G/100ML
1000 INJECTION, SOLUTION INTRAVENOUS PRN
Status: DISCONTINUED | OUTPATIENT
Start: 2025-08-05 | End: 2025-08-05

## 2025-08-05 RX ORDER — INDOMETHACIN 25 MG/1
CAPSULE ORAL
Status: DISCONTINUED | OUTPATIENT
Start: 2025-08-05 | End: 2025-08-05 | Stop reason: SDUPTHER

## 2025-08-05 RX ORDER — TERBUTALINE SULFATE 1 MG/ML
0.25 INJECTION SUBCUTANEOUS ONCE
Status: DISCONTINUED | OUTPATIENT
Start: 2025-08-05 | End: 2025-08-06

## 2025-08-05 RX ORDER — BUTORPHANOL TARTRATE 2 MG/ML
1 INJECTION, SOLUTION INTRAMUSCULAR; INTRAVENOUS
Status: DISCONTINUED | OUTPATIENT
Start: 2025-08-05 | End: 2025-08-05

## 2025-08-05 RX ORDER — CARBOPROST TROMETHAMINE 250 UG/ML
250 INJECTION, SOLUTION INTRAMUSCULAR PRN
Status: DISCONTINUED | OUTPATIENT
Start: 2025-08-05 | End: 2025-08-05

## 2025-08-05 RX ORDER — METHYLERGONOVINE MALEATE 0.2 MG/ML
200 INJECTION INTRAVENOUS PRN
Status: DISCONTINUED | OUTPATIENT
Start: 2025-08-05 | End: 2025-08-06

## 2025-08-05 RX ORDER — SODIUM CHLORIDE, SODIUM LACTATE, POTASSIUM CHLORIDE, AND CALCIUM CHLORIDE .6; .31; .03; .02 G/100ML; G/100ML; G/100ML; G/100ML
500 INJECTION, SOLUTION INTRAVENOUS PRN
Status: DISCONTINUED | OUTPATIENT
Start: 2025-08-05 | End: 2025-08-06

## 2025-08-05 RX ORDER — LIDOCAINE HYDROCHLORIDE AND EPINEPHRINE 15; 5 MG/ML; UG/ML
INJECTION, SOLUTION EPIDURAL
Status: DISCONTINUED | OUTPATIENT
Start: 2025-08-05 | End: 2025-08-05 | Stop reason: SDUPTHER

## 2025-08-05 RX ORDER — FAMOTIDINE 20 MG/1
20 TABLET, FILM COATED ORAL ONCE
Status: COMPLETED | OUTPATIENT
Start: 2025-08-05 | End: 2025-08-05

## 2025-08-05 RX ORDER — ACETAMINOPHEN 500 MG
1000 TABLET ORAL EVERY 8 HOURS SCHEDULED
Status: DISPENSED | OUTPATIENT
Start: 2025-08-05 | End: 2025-08-06

## 2025-08-05 RX ORDER — LOPERAMIDE HYDROCHLORIDE 2 MG/1
2 CAPSULE ORAL PRN
Status: DISCONTINUED | OUTPATIENT
Start: 2025-08-05 | End: 2025-08-06

## 2025-08-05 RX ORDER — ROPIVACAINE HYDROCHLORIDE 2 MG/ML
INJECTION, SOLUTION EPIDURAL; INFILTRATION; PERINEURAL
Status: DISCONTINUED | OUTPATIENT
Start: 2025-08-05 | End: 2025-08-05 | Stop reason: SDUPTHER

## 2025-08-05 RX ORDER — LOPERAMIDE HYDROCHLORIDE 2 MG/1
2 CAPSULE ORAL PRN
Status: DISCONTINUED | OUTPATIENT
Start: 2025-08-05 | End: 2025-08-05

## 2025-08-05 RX ORDER — SODIUM CHLORIDE, SODIUM LACTATE, POTASSIUM CHLORIDE, CALCIUM CHLORIDE 600; 310; 30; 20 MG/100ML; MG/100ML; MG/100ML; MG/100ML
INJECTION, SOLUTION INTRAVENOUS
Status: DISCONTINUED | OUTPATIENT
Start: 2025-08-05 | End: 2025-08-05 | Stop reason: SDUPTHER

## 2025-08-05 RX ORDER — LIDOCAINE HYDROCHLORIDE AND EPINEPHRINE 20; 5 MG/ML; UG/ML
INJECTION, SOLUTION EPIDURAL; INFILTRATION; INTRACAUDAL; PERINEURAL
Status: DISCONTINUED | OUTPATIENT
Start: 2025-08-05 | End: 2025-08-05 | Stop reason: SDUPTHER

## 2025-08-05 RX ORDER — NALBUPHINE HYDROCHLORIDE 10 MG/ML
5 INJECTION INTRAMUSCULAR; INTRAVENOUS; SUBCUTANEOUS EVERY 6 HOURS PRN
Status: ACTIVE | OUTPATIENT
Start: 2025-08-05 | End: 2025-08-06

## 2025-08-05 RX ORDER — ONDANSETRON 4 MG/1
4 TABLET, ORALLY DISINTEGRATING ORAL EVERY 8 HOURS PRN
Status: DISCONTINUED | OUTPATIENT
Start: 2025-08-05 | End: 2025-08-06

## 2025-08-05 RX ORDER — DOCUSATE SODIUM 100 MG/1
100 CAPSULE, LIQUID FILLED ORAL 2 TIMES DAILY
Status: DISCONTINUED | OUTPATIENT
Start: 2025-08-05 | End: 2025-08-06

## 2025-08-05 RX ORDER — ONDANSETRON 2 MG/ML
4 INJECTION INTRAMUSCULAR; INTRAVENOUS ONCE
Status: COMPLETED | OUTPATIENT
Start: 2025-08-05 | End: 2025-08-05

## 2025-08-05 RX ORDER — DEXTROSE, SODIUM CHLORIDE, SODIUM LACTATE, POTASSIUM CHLORIDE, AND CALCIUM CHLORIDE 5; .6; .31; .03; .02 G/100ML; G/100ML; G/100ML; G/100ML; G/100ML
INJECTION, SOLUTION INTRAVENOUS CONTINUOUS
Status: DISCONTINUED | OUTPATIENT
Start: 2025-08-05 | End: 2025-08-05

## 2025-08-05 RX ORDER — METHYLERGONOVINE MALEATE 0.2 MG/ML
200 INJECTION INTRAVENOUS PRN
Status: DISCONTINUED | OUTPATIENT
Start: 2025-08-05 | End: 2025-08-05

## 2025-08-05 RX ORDER — METHYLERGONOVINE MALEATE 0.2 MG/ML
INJECTION INTRAVENOUS
Status: DISCONTINUED | OUTPATIENT
Start: 2025-08-05 | End: 2025-08-05 | Stop reason: SDUPTHER

## 2025-08-05 RX ORDER — HYDROMORPHONE HYDROCHLORIDE 1 MG/ML
0.5 INJECTION, SOLUTION INTRAMUSCULAR; INTRAVENOUS; SUBCUTANEOUS EVERY 4 HOURS PRN
Status: ACTIVE | OUTPATIENT
Start: 2025-08-05 | End: 2025-08-06

## 2025-08-05 RX ADMIN — BUTORPHANOL TARTRATE 1 MG: 2 INJECTION, SOLUTION INTRAMUSCULAR; INTRAVENOUS at 13:34

## 2025-08-05 RX ADMIN — LIDOCAINE HYDROCHLORIDE,EPINEPHRINE BITARTRATE 2 ML: 15; .005 INJECTION, SOLUTION EPIDURAL; INFILTRATION; INTRACAUDAL; PERINEURAL at 15:34

## 2025-08-05 RX ADMIN — PHENYLEPHRINE HYDROCHLORIDE 100 MCG: 0.1 INJECTION, SOLUTION INTRAVENOUS at 17:28

## 2025-08-05 RX ADMIN — ROPIVACAINE HYDROCHLORIDE 5 ML: 2 INJECTION, SOLUTION EPIDURAL; INFILTRATION; PERINEURAL at 15:35

## 2025-08-05 RX ADMIN — FENTANYL CITRATE 100 MCG: 50 INJECTION, SOLUTION INTRAMUSCULAR; INTRAVENOUS at 17:17

## 2025-08-05 RX ADMIN — LIDOCAINE HYDROCHLORIDE AND EPINEPHRINE 5 ML: 20; 5 INJECTION, SOLUTION EPIDURAL; INFILTRATION; INTRACAUDAL; PERINEURAL at 17:09

## 2025-08-05 RX ADMIN — FAMOTIDINE 20 MG: 20 TABLET, FILM COATED ORAL at 17:08

## 2025-08-05 RX ADMIN — KETOROLAC TROMETHAMINE 30 MG: 30 INJECTION, SOLUTION INTRAMUSCULAR; INTRAVENOUS at 17:48

## 2025-08-05 RX ADMIN — BUTORPHANOL TARTRATE 1 MG: 2 INJECTION, SOLUTION INTRAMUSCULAR; INTRAVENOUS at 14:39

## 2025-08-05 RX ADMIN — SODIUM CHLORIDE, SODIUM LACTATE, POTASSIUM CHLORIDE, AND CALCIUM CHLORIDE: 600; 310; 30; 20 INJECTION, SOLUTION INTRAVENOUS at 17:17

## 2025-08-05 RX ADMIN — Medication 50 MCG: at 05:36

## 2025-08-05 RX ADMIN — ONDANSETRON 4 MG: 2 INJECTION, SOLUTION INTRAMUSCULAR; INTRAVENOUS at 11:14

## 2025-08-05 RX ADMIN — PHENYLEPHRINE HYDROCHLORIDE 100 MCG: 0.1 INJECTION, SOLUTION INTRAVENOUS at 17:22

## 2025-08-05 RX ADMIN — LIDOCAINE HYDROCHLORIDE AND EPINEPHRINE 5 ML: 20; 5 INJECTION, SOLUTION EPIDURAL; INFILTRATION; INTRACAUDAL; PERINEURAL at 17:24

## 2025-08-05 RX ADMIN — SODIUM CHLORIDE, SODIUM LACTATE, POTASSIUM CHLORIDE, AND CALCIUM CHLORIDE: .6; .31; .03; .02 INJECTION, SOLUTION INTRAVENOUS at 19:03

## 2025-08-05 RX ADMIN — FENTANYL CITRATE 100 MCG: 50 INJECTION, SOLUTION INTRAMUSCULAR; INTRAVENOUS at 15:35

## 2025-08-05 RX ADMIN — LIDOCAINE HYDROCHLORIDE AND EPINEPHRINE 5 ML: 20; 5 INJECTION, SOLUTION EPIDURAL; INFILTRATION; INTRACAUDAL; PERINEURAL at 17:17

## 2025-08-05 RX ADMIN — SODIUM CHLORIDE, SODIUM LACTATE, POTASSIUM CHLORIDE, AND CALCIUM CHLORIDE 500 ML: .6; .31; .03; .02 INJECTION, SOLUTION INTRAVENOUS at 15:17

## 2025-08-05 RX ADMIN — MORPHINE SULFATE 3 MG: 0.5 INJECTION, SOLUTION EPIDURAL; INTRATHECAL; INTRAVENOUS at 17:43

## 2025-08-05 RX ADMIN — KETOROLAC TROMETHAMINE 30 MG: 30 INJECTION, SOLUTION INTRAMUSCULAR at 23:50

## 2025-08-05 RX ADMIN — OXYTOCIN 2 MILLI-UNITS/MIN: 10 INJECTION, SOLUTION INTRAMUSCULAR; INTRAVENOUS at 10:02

## 2025-08-05 RX ADMIN — Medication 50 MCG: at 01:38

## 2025-08-05 RX ADMIN — ONDANSETRON 4 MG: 2 INJECTION, SOLUTION INTRAMUSCULAR; INTRAVENOUS at 17:09

## 2025-08-05 RX ADMIN — CEFAZOLIN 2000 MG: 10 INJECTION, POWDER, FOR SOLUTION INTRAVENOUS at 17:04

## 2025-08-05 RX ADMIN — SODIUM BICARBONATE 2 MEQ: 84 INJECTION INTRAVENOUS at 17:09

## 2025-08-05 RX ADMIN — LIDOCAINE HYDROCHLORIDE,EPINEPHRINE BITARTRATE 3 ML: 15; .005 INJECTION, SOLUTION EPIDURAL; INFILTRATION; INTRACAUDAL; PERINEURAL at 15:32

## 2025-08-05 RX ADMIN — Medication 500 ML/HR: at 17:32

## 2025-08-05 RX ADMIN — ROPIVACAINE HYDROCHLORIDE 10 ML/HR: 2 INJECTION, SOLUTION EPIDURAL; INFILTRATION; PERINEURAL at 15:36

## 2025-08-05 RX ADMIN — DEXTROSE, SODIUM CHLORIDE, SODIUM LACTATE, POTASSIUM CHLORIDE, AND CALCIUM CHLORIDE: 5; .6; .31; .03; .02 INJECTION, SOLUTION INTRAVENOUS at 10:00

## 2025-08-05 RX ADMIN — ONDANSETRON 4 MG: 2 INJECTION, SOLUTION INTRAMUSCULAR; INTRAVENOUS at 23:56

## 2025-08-05 RX ADMIN — LIDOCAINE HYDROCHLORIDE AND EPINEPHRINE 5 ML: 20; 5 INJECTION, SOLUTION EPIDURAL; INFILTRATION; INTRACAUDAL; PERINEURAL at 17:13

## 2025-08-05 RX ADMIN — METHYLERGONOVINE MALEATE 200 MCG: 0.2 INJECTION, SOLUTION INTRAMUSCULAR; INTRAVENOUS at 17:38

## 2025-08-05 ASSESSMENT — PAIN SCALES - GENERAL
PAINLEVEL_OUTOF10: 2
PAINLEVEL_OUTOF10: 0
PAINLEVEL_OUTOF10: 7
PAINLEVEL_OUTOF10: 7

## 2025-08-05 ASSESSMENT — PAIN DESCRIPTION - LOCATION
LOCATION: ABDOMEN;INCISION
LOCATION: ABDOMEN
LOCATION: ABDOMEN

## 2025-08-05 ASSESSMENT — PAIN DESCRIPTION - ORIENTATION
ORIENTATION: ANTERIOR
ORIENTATION: ANTERIOR;LOWER

## 2025-08-05 ASSESSMENT — PAIN DESCRIPTION - DESCRIPTORS: DESCRIPTORS: SORE;PRESSURE

## 2025-08-06 ENCOUNTER — ANESTHESIA (OUTPATIENT)
Dept: MOTHER INFANT UNIT | Age: 25
End: 2025-08-06
Payer: COMMERCIAL

## 2025-08-06 ENCOUNTER — ANESTHESIA EVENT (OUTPATIENT)
Dept: MOTHER INFANT UNIT | Age: 25
End: 2025-08-06
Payer: COMMERCIAL

## 2025-08-06 LAB
ERYTHROCYTE [DISTWIDTH] IN BLOOD BY AUTOMATED COUNT: 14 % (ref 11.9–14.6)
HCT VFR BLD AUTO: 32.7 % (ref 35.8–46.3)
HGB BLD-MCNC: 10.9 G/DL (ref 11.7–15.4)
MCH RBC QN AUTO: 29.3 PG (ref 26.1–32.9)
MCHC RBC AUTO-ENTMCNC: 33.3 G/DL (ref 31.4–35)
MCV RBC AUTO: 87.9 FL (ref 82–102)
NRBC # BLD: 0 K/UL (ref 0–0.2)
PLATELET # BLD AUTO: 145 K/UL (ref 150–450)
PMV BLD AUTO: 10.5 FL (ref 9.4–12.3)
RBC # BLD AUTO: 3.72 M/UL (ref 4.05–5.2)
WBC # BLD AUTO: 18.9 K/UL (ref 4.3–11.1)

## 2025-08-06 PROCEDURE — 36415 COLL VENOUS BLD VENIPUNCTURE: CPT

## 2025-08-06 PROCEDURE — 85027 COMPLETE CBC AUTOMATED: CPT

## 2025-08-06 PROCEDURE — 6370000000 HC RX 637 (ALT 250 FOR IP): Performed by: OBSTETRICS & GYNECOLOGY

## 2025-08-06 PROCEDURE — 6360000002 HC RX W HCPCS: Performed by: ANESTHESIOLOGY

## 2025-08-06 PROCEDURE — 1100000000 HC RM PRIVATE

## 2025-08-06 PROCEDURE — 6370000000 HC RX 637 (ALT 250 FOR IP): Performed by: ANESTHESIOLOGY

## 2025-08-06 RX ORDER — SODIUM CHLORIDE 0.9 % (FLUSH) 0.9 %
5-40 SYRINGE (ML) INJECTION EVERY 12 HOURS SCHEDULED
Status: DISCONTINUED | OUTPATIENT
Start: 2025-08-06 | End: 2025-08-07 | Stop reason: HOSPADM

## 2025-08-06 RX ORDER — DEXTROSE, SODIUM CHLORIDE, SODIUM LACTATE, POTASSIUM CHLORIDE, AND CALCIUM CHLORIDE 5; .6; .31; .03; .02 G/100ML; G/100ML; G/100ML; G/100ML; G/100ML
INJECTION, SOLUTION INTRAVENOUS CONTINUOUS
Status: DISCONTINUED | OUTPATIENT
Start: 2025-08-06 | End: 2025-08-07 | Stop reason: HOSPADM

## 2025-08-06 RX ORDER — FERROUS SULFATE 325(65) MG
325 TABLET ORAL 2 TIMES DAILY WITH MEALS
Status: DISCONTINUED | OUTPATIENT
Start: 2025-08-06 | End: 2025-08-07 | Stop reason: HOSPADM

## 2025-08-06 RX ORDER — ACETAMINOPHEN 500 MG
1000 TABLET ORAL EVERY 6 HOURS PRN
Status: DISCONTINUED | OUTPATIENT
Start: 2025-08-06 | End: 2025-08-07 | Stop reason: HOSPADM

## 2025-08-06 RX ORDER — OXYCODONE HYDROCHLORIDE 5 MG/1
10 TABLET ORAL EVERY 4 HOURS PRN
Status: DISCONTINUED | OUTPATIENT
Start: 2025-08-06 | End: 2025-08-07 | Stop reason: HOSPADM

## 2025-08-06 RX ORDER — FAMOTIDINE 20 MG/1
20 TABLET, FILM COATED ORAL 2 TIMES DAILY
Status: DISCONTINUED | OUTPATIENT
Start: 2025-08-06 | End: 2025-08-07 | Stop reason: HOSPADM

## 2025-08-06 RX ORDER — OXYCODONE HYDROCHLORIDE 5 MG/1
5 TABLET ORAL EVERY 4 HOURS PRN
Status: DISCONTINUED | OUTPATIENT
Start: 2025-08-06 | End: 2025-08-07 | Stop reason: HOSPADM

## 2025-08-06 RX ORDER — LANOLIN
CREAM (ML) TOPICAL
Status: DISCONTINUED | OUTPATIENT
Start: 2025-08-06 | End: 2025-08-07 | Stop reason: HOSPADM

## 2025-08-06 RX ORDER — ONDANSETRON 4 MG/1
4 TABLET, ORALLY DISINTEGRATING ORAL EVERY 8 HOURS PRN
Status: DISCONTINUED | OUTPATIENT
Start: 2025-08-06 | End: 2025-08-07 | Stop reason: HOSPADM

## 2025-08-06 RX ORDER — IBUPROFEN 800 MG/1
800 TABLET, FILM COATED ORAL EVERY 8 HOURS
Status: DISCONTINUED | OUTPATIENT
Start: 2025-08-06 | End: 2025-08-07 | Stop reason: HOSPADM

## 2025-08-06 RX ORDER — PRENATAL VIT/IRON FUM/FOLIC AC 27MG-0.8MG
1 TABLET ORAL DAILY
Status: DISCONTINUED | OUTPATIENT
Start: 2025-08-06 | End: 2025-08-07 | Stop reason: HOSPADM

## 2025-08-06 RX ORDER — DOCUSATE SODIUM 100 MG/1
100 CAPSULE, LIQUID FILLED ORAL 2 TIMES DAILY
Status: DISCONTINUED | OUTPATIENT
Start: 2025-08-06 | End: 2025-08-07 | Stop reason: HOSPADM

## 2025-08-06 RX ORDER — KETOROLAC TROMETHAMINE 30 MG/ML
30 INJECTION, SOLUTION INTRAMUSCULAR; INTRAVENOUS EVERY 6 HOURS
Status: DISCONTINUED | OUTPATIENT
Start: 2025-08-06 | End: 2025-08-06

## 2025-08-06 RX ORDER — LOPERAMIDE HYDROCHLORIDE 2 MG/1
2 CAPSULE ORAL PRN
Status: DISCONTINUED | OUTPATIENT
Start: 2025-08-06 | End: 2025-08-07 | Stop reason: HOSPADM

## 2025-08-06 RX ORDER — ONDANSETRON 2 MG/ML
4 INJECTION INTRAMUSCULAR; INTRAVENOUS EVERY 6 HOURS PRN
Status: DISCONTINUED | OUTPATIENT
Start: 2025-08-06 | End: 2025-08-07 | Stop reason: HOSPADM

## 2025-08-06 RX ORDER — MISOPROSTOL 200 UG/1
200 TABLET ORAL PRN
Status: DISCONTINUED | OUTPATIENT
Start: 2025-08-06 | End: 2025-08-07 | Stop reason: HOSPADM

## 2025-08-06 RX ORDER — IBUPROFEN 800 MG/1
800 TABLET, FILM COATED ORAL EVERY 8 HOURS
Status: DISCONTINUED | OUTPATIENT
Start: 2025-08-07 | End: 2025-08-06

## 2025-08-06 RX ADMIN — ACETAMINOPHEN 1000 MG: 500 TABLET, FILM COATED ORAL at 23:55

## 2025-08-06 RX ADMIN — KETOROLAC TROMETHAMINE 30 MG: 30 INJECTION, SOLUTION INTRAMUSCULAR at 05:52

## 2025-08-06 RX ADMIN — KETOROLAC TROMETHAMINE 30 MG: 30 INJECTION, SOLUTION INTRAMUSCULAR at 12:16

## 2025-08-06 RX ADMIN — ACETAMINOPHEN 1000 MG: 500 TABLET, FILM COATED ORAL at 16:09

## 2025-08-06 RX ADMIN — FAMOTIDINE 20 MG: 20 TABLET, FILM COATED ORAL at 20:07

## 2025-08-06 RX ADMIN — DOCUSATE SODIUM 100 MG: 100 CAPSULE, LIQUID FILLED ORAL at 20:07

## 2025-08-06 RX ADMIN — ACETAMINOPHEN 1000 MG: 500 TABLET, FILM COATED ORAL at 07:19

## 2025-08-06 RX ADMIN — IBUPROFEN 800 MG: 800 TABLET, FILM COATED ORAL at 20:07

## 2025-08-06 RX ADMIN — DOCUSATE SODIUM 100 MG: 100 CAPSULE ORAL at 07:19

## 2025-08-06 ASSESSMENT — PAIN SCALES - GENERAL: PAINLEVEL_OUTOF10: 3

## 2025-08-06 ASSESSMENT — PAIN DESCRIPTION - DESCRIPTORS: DESCRIPTORS: SORE;TENDER

## 2025-08-06 ASSESSMENT — PAIN DESCRIPTION - ORIENTATION: ORIENTATION: ANTERIOR;LOWER

## 2025-08-06 ASSESSMENT — PAIN DESCRIPTION - LOCATION: LOCATION: INCISION

## 2025-08-07 VITALS
OXYGEN SATURATION: 97 % | DIASTOLIC BLOOD PRESSURE: 74 MMHG | TEMPERATURE: 97.9 F | RESPIRATION RATE: 17 BRPM | HEART RATE: 83 BPM | SYSTOLIC BLOOD PRESSURE: 131 MMHG

## 2025-08-07 PROCEDURE — 6370000000 HC RX 637 (ALT 250 FOR IP): Performed by: OBSTETRICS & GYNECOLOGY

## 2025-08-07 RX ORDER — OXYCODONE HYDROCHLORIDE 5 MG/1
5 TABLET ORAL EVERY 6 HOURS PRN
Qty: 15 TABLET | Refills: 0 | Status: SHIPPED | OUTPATIENT
Start: 2025-08-07 | End: 2025-08-12

## 2025-08-07 RX ORDER — IBUPROFEN 800 MG/1
800 TABLET, FILM COATED ORAL EVERY 8 HOURS PRN
Qty: 60 TABLET | Refills: 0 | Status: SHIPPED | OUTPATIENT
Start: 2025-08-07

## 2025-08-07 RX ADMIN — ACETAMINOPHEN 1000 MG: 500 TABLET, FILM COATED ORAL at 06:04

## 2025-08-07 RX ADMIN — DOCUSATE SODIUM 100 MG: 100 CAPSULE, LIQUID FILLED ORAL at 07:56

## 2025-08-07 RX ADMIN — IBUPROFEN 800 MG: 800 TABLET, FILM COATED ORAL at 11:06

## 2025-08-07 RX ADMIN — PRENATAL VIT W/ FE FUMARATE-FA TAB 27-0.8 MG 1 TABLET: 27-0.8 TAB at 07:56

## 2025-08-07 RX ADMIN — FAMOTIDINE 20 MG: 20 TABLET, FILM COATED ORAL at 07:56

## 2025-08-07 RX ADMIN — IBUPROFEN 800 MG: 800 TABLET, FILM COATED ORAL at 04:21

## 2025-08-07 ASSESSMENT — PAIN DESCRIPTION - ORIENTATION: ORIENTATION: ANTERIOR;LOWER

## 2025-08-07 ASSESSMENT — PAIN SCALES - GENERAL: PAINLEVEL_OUTOF10: 3

## 2025-08-07 ASSESSMENT — PAIN DESCRIPTION - LOCATION: LOCATION: INCISION

## 2025-08-07 ASSESSMENT — PAIN DESCRIPTION - DESCRIPTORS: DESCRIPTORS: SORE

## 2025-08-21 ENCOUNTER — PATIENT MESSAGE (OUTPATIENT)
Dept: OBGYN CLINIC | Age: 25
End: 2025-08-21

## 2025-08-21 ENCOUNTER — POSTPARTUM VISIT (OUTPATIENT)
Dept: OBGYN CLINIC | Age: 25
End: 2025-08-21

## 2025-08-21 VITALS — SYSTOLIC BLOOD PRESSURE: 118 MMHG | WEIGHT: 186.6 LBS | BODY MASS INDEX: 32.03 KG/M2 | DIASTOLIC BLOOD PRESSURE: 78 MMHG

## 2025-08-21 PROCEDURE — 0503F POSTPARTUM CARE VISIT: CPT | Performed by: OBSTETRICS & GYNECOLOGY

## 2025-08-21 RX ORDER — BREAST PUMP
1 EACH MISCELLANEOUS ONCE
Qty: 1 EACH | Refills: 0 | Status: SHIPPED | OUTPATIENT
Start: 2025-08-21 | End: 2025-08-21

## 2025-08-21 RX ORDER — BREAST PUMP
1 EACH MISCELLANEOUS ONCE
Qty: 1 EACH | Refills: 0 | Status: SHIPPED | OUTPATIENT
Start: 2025-08-21 | End: 2025-08-21 | Stop reason: CLARIF

## (undated) DEVICE — PACK SURG CUST C SECT CDS SFE

## (undated) DEVICE — SPONGE LAP W18XL18IN WHT COT 4 PLY FLD STRUNG RADPQ DISP ST 2 PER PACK

## (undated) DEVICE — Z DISCONTINUED NO SUB IDED TRAY PREP DRY W/ PREM GLV 2 APPL 6 SPNG 2 UNDPD 1 OVERWRAP

## (undated) DEVICE — SOLUTION IV 1000ML 0.9% SOD CHL

## (undated) DEVICE — Z DISCONTINUED NO SUB IDED TRAY CATH 16FR PVC INTMIT STR TIP PREATTACH TO 1000ML COLL

## (undated) DEVICE — ELECTRODE PT RET AD L9FT HI MOIST COND ADH HYDRGEL CORDED

## (undated) DEVICE — APPLICATOR MEDICATED 26 CC SOLUTION HI LT ORNG CHLORAPREP

## (undated) DEVICE — BLOOD SAMPLING KIT SYR 23 GAX1 IN 3 CC LL FILTER PRO PR VNT+

## (undated) DEVICE — PENCIL SMK EVAC TELSCP 4.5 M TBNG

## (undated) DEVICE — SPONGE GZ W4XL4IN 12 PLY

## (undated) DEVICE — DRESSING NONADHESIVE W3XL8IN WHT ANTIMIC RECT TELFA AMD

## (undated) DEVICE — STRIP SKIN CLSR W0.5XL4IN WHT NONWOVEN BK ADH REINF

## (undated) DEVICE — SUTURE VICRYL SZ 2-0 L36IN ABSRB VLT L36MM CT-1 1/2 CIR J345H

## (undated) DEVICE — SUTURE ABSORBABLE ANTIBACT 1-0 CT-1 24 IN STRATAFIX PDS + SXPP1A443

## (undated) DEVICE — TUBING SCTION CONN 1/4X10 RIB

## (undated) DEVICE — STAPLER SKIN SQ 30 ABSRB STPL DISP INSORB ORDER VIA PHONE OR EMAIL

## (undated) DEVICE — SLEEVE COMPR M KNEE LEN EXPR FOR SCD 700 SER COMPR SYS

## (undated) DEVICE — SOLUTION IRRIG 1000ML H2O STRL BLT